# Patient Record
Sex: MALE | Race: WHITE | HISPANIC OR LATINO | Employment: OTHER | URBAN - METROPOLITAN AREA
[De-identification: names, ages, dates, MRNs, and addresses within clinical notes are randomized per-mention and may not be internally consistent; named-entity substitution may affect disease eponyms.]

---

## 2017-08-22 RX ORDER — CARVEDILOL 25 MG/1
25 TABLET ORAL DAILY
Qty: 30 TABLET | Refills: 1 | Status: SHIPPED | OUTPATIENT
Start: 2017-08-22 | End: 2017-08-22 | Stop reason: SDUPTHER

## 2017-08-22 RX ORDER — CARVEDILOL 25 MG/1
25 TABLET ORAL DAILY
Qty: 30 TABLET | Refills: 1 | Status: SHIPPED | OUTPATIENT
Start: 2017-08-22 | End: 2018-04-10

## 2017-08-22 RX ORDER — CARVEDILOL 25 MG/1
25 TABLET ORAL
COMMUNITY
Start: 2014-08-21 | End: 2017-08-22 | Stop reason: SDUPTHER

## 2018-04-10 ENCOUNTER — TELEPHONE (OUTPATIENT)
Dept: CARDIOLOGY | Facility: CLINIC | Age: 78
End: 2018-04-10

## 2018-04-10 ENCOUNTER — CLINICAL SUPPORT NO REQUIREMENTS (OUTPATIENT)
Dept: CARDIOLOGY | Facility: CLINIC | Age: 78
End: 2018-04-10

## 2018-04-10 ENCOUNTER — OFFICE VISIT (OUTPATIENT)
Dept: CARDIOLOGY | Facility: CLINIC | Age: 78
End: 2018-04-10

## 2018-04-10 VITALS
HEIGHT: 73 IN | BODY MASS INDEX: 28.76 KG/M2 | SYSTOLIC BLOOD PRESSURE: 142 MMHG | WEIGHT: 217 LBS | HEART RATE: 56 BPM | DIASTOLIC BLOOD PRESSURE: 88 MMHG

## 2018-04-10 DIAGNOSIS — Z95.810 IMPLANTABLE CARDIOVERTER-DEFIBRILLATOR (ICD) IN SITU: ICD-10-CM

## 2018-04-10 DIAGNOSIS — I25.10 CORONARY ARTERY DISEASE INVOLVING NATIVE CORONARY ARTERY OF NATIVE HEART WITHOUT ANGINA PECTORIS: ICD-10-CM

## 2018-04-10 DIAGNOSIS — I25.5 ISCHEMIC CARDIOMYOPATHY: Primary | ICD-10-CM

## 2018-04-10 DIAGNOSIS — I42.9 CARDIOMYOPATHY, UNSPECIFIED TYPE (HCC): Primary | ICD-10-CM

## 2018-04-10 DIAGNOSIS — I10 ESSENTIAL HYPERTENSION: ICD-10-CM

## 2018-04-10 PROBLEM — N18.30 STAGE 3 CHRONIC KIDNEY DISEASE (HCC): Status: ACTIVE | Noted: 2018-04-10

## 2018-04-10 PROBLEM — E11.9 TYPE 2 DIABETES MELLITUS: Status: ACTIVE | Noted: 2018-04-10

## 2018-04-10 PROCEDURE — 93000 ELECTROCARDIOGRAM COMPLETE: CPT | Performed by: NURSE PRACTITIONER

## 2018-04-10 PROCEDURE — 93283 PRGRMG EVAL IMPLANTABLE DFB: CPT | Performed by: INTERNAL MEDICINE

## 2018-04-10 PROCEDURE — 99214 OFFICE O/P EST MOD 30 MIN: CPT | Performed by: NURSE PRACTITIONER

## 2018-04-10 RX ORDER — CARVEDILOL 25 MG/1
25 TABLET ORAL 2 TIMES DAILY
COMMUNITY
Start: 2018-02-06

## 2018-04-10 RX ORDER — INSULIN GLARGINE 100 [IU]/ML
11 INJECTION, SOLUTION SUBCUTANEOUS
COMMUNITY
Start: 2018-02-06

## 2018-04-10 RX ORDER — LISINOPRIL 10 MG/1
10 TABLET ORAL
COMMUNITY
Start: 2018-02-06

## 2018-04-10 RX ORDER — ASPIRIN 325 MG
325 TABLET ORAL
COMMUNITY
End: 2019-12-04

## 2018-04-10 NOTE — TELEPHONE ENCOUNTER
Let him know that is good-he needs to be sure he is taking the cavedilol twice a day-he told me he didn't think he was taking something twice a day just clarify that is how he is taking it.

## 2018-04-10 NOTE — PROGRESS NOTES
"Date of Office Visit: 04/10/2018  Encounter Provider: JOSUE Sahu  Place of Service: [facility] CARDIOLOGY  Patient Name: Meet Wood Jr.  :1940    Chief Complaint   Patient presents with   • Cardiomyopathy     ICD check   :     HPI: Meet Wood Jr. is a 78 y.o. male who is a patient with a history of MI/CAD/PTCA, ICM, s/p ICD, HTN, HLD, CKD (Dr. Francis) and DM. He was last seen in the office by Dr. Lugo in 2015 and the last time his device was checked by us was 2016. He presents today for routine follow up. He is unsure how he really got lost to follow up. He says his remote monitor is still hooked up but we have not gotten any readings from him since 2016. In reviewing his history with him he tells me he had a heart attack in  and had an angioplasty at [facility] by Dr. Moody. 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 He had a new atrial lead and generator replacement in .     He follows with Dr. Aguilar regularly and also follows with Dr. Francis for his CKD. He says his kidney function has been stable as far as he knows. I am able to review labs in [facility]where from 2017 that showed a BUN 32, creat, 1.6, K+ 5.2, A1c 7.8. He was on good medical therapy when he was last seen in 2015. Today he has cavedilol and lisinopril on his list but he is unsure of the dosages and he says that one of them is prescribed twice a day but he only takes it once a day and has done that for about a year. He just stopped taking it twice a day because he thought it was maybe too much for his kidneys. He is going to call us with his medications, doses and how they are prescribed and how he is really taking them.    He feels good. He denies chest pain, shortness of breath, PND, orthopnea, edema, dizziness or palpitaitons.    Device interrogation today shows normal " device testing and function. He is AS/VS. He has A paced 2% and V paces <1%. He has had 5 ATR episodes lasting 5-11 sec. In duration and 6 non-sustained VT episodes, stored egms 8 and 10 beats in duration. He does not recall any symptoms. Last NSVT episode 10/2/17 and last ATR 2/15/18.           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 2002   • Chronic kidney disease     CKD 3   • Diabetes    • Hyperlipidemia    • Hypertension    • Ischemic cardiomyopathy    • Myocardial infarction 1995    PTCA only   • Sinus bradycardia        Past Surgical History:   Procedure Laterality Date   • CARDIAC DEFIBRILLATOR PLACEMENT  04/30/2009    !st device 2002, 2009 new generator and atrial lead   • CORONARY ANGIOPLASTY  1995       Social History     Social History   • Marital status:      Spouse name: N/A   • Number of children: N/A   • Years of education: N/A     Occupational History   • Not on file.     Social History Main Topics   • Smoking status: Former Smoker   • Smokeless tobacco: Not on file      Comment: no caffeine use   • Alcohol use Yes   • Drug use: Unknown   • Sexual activity: Not on file     Other Topics Concern   • Not on file     Social History Narrative   • No narrative on file       Family History   Problem Relation Age of Onset   • Heart attack Maternal Grandmother        Review of Systems   Constitution: Negative for chills, fever and malaise/fatigue.   Cardiovascular: Negative for chest pain, dyspnea on exertion, leg swelling, near-syncope, orthopnea, palpitations, paroxysmal nocturnal dyspnea and syncope.   Respiratory: Negative for cough and shortness of breath.    Musculoskeletal: Negative for joint pain, joint swelling and myalgias.   Gastrointestinal: Negative for abdominal pain, diarrhea, melena, nausea and vomiting.   Genitourinary: Negative for frequency and hematuria.   Neurological: Negative for light-headedness, numbness,  "paresthesias and seizures.   Allergic/Immunologic: Negative.    All other systems reviewed and are negative.      Allergies   Allergen Reactions   • Sulfa Antibiotics Rash         Current Outpatient Prescriptions:   •  aspirin 325 MG tablet, Take 325 mg by mouth., Disp: , Rfl:   •  carvedilol (COREG) 25 MG tablet, Take 25 mg by mouth 2 (Two) Times a Day., Disp: , Rfl:   •  insulin glargine (LANTUS) 100 UNIT/ML injection, Inject 11 Units under the skin., Disp: , Rfl:   •  insulin lispro (humaLOG) 100 UNIT/ML injection, Inject 1 Units under the skin., Disp: , Rfl:   •  lisinopril (PRINIVIL,ZESTRIL) 10 MG tablet, Take 10 mg by mouth., Disp: , Rfl:       Objective:     Vitals:    04/10/18 1256 04/10/18 1341   BP: 178/90 142/88   Pulse: 56    Weight: 98.4 kg (217 lb)    Height: 185.4 cm (73\")      Body mass index is 28.63 kg/m².    PHYSICAL EXAM:    Vitals Reviewed.   General Appearance: No acute distress, well developed and well nourished.   Eyes: Conjunctiva and lids: No erythema, swelling, or discharge. Sclera non-icteric.   HENT: Atraumatic, normocephalic. External eyes, ears, and nose normal.   Respiratory: No signs of respiratory distress. Respiration rhythm and depth normal.   Clear to auscultation. No rales, crackles, rhonchi, or wheezing auscultated.   Cardiovascular:  Jugular Venous Pressure: Normal  Heart Rate and Rhythm: Normal, Heart Sounds: Normal S1 and S2. No S3 or S4 noted.  Murmurs: No murmurs noted. No rubs, thrills, or gallops.   Arterial Pulses:  Posterior tibialis and dorsalis pedis pulses normal.   Lower Extremities: No edema noted.  Gastrointestinal:  Abdomen soft, non-distended, non-tender.   Musculoskeletal: Normal movement of extremities  Skin and Nails: General appearance normal. No pallor, cyanosis, diaphoresis. Skin temperature normal. No clubbing of fingernails.   Psychiatric: Patient alert and oriented to person, place, and time. Speech and behavior appropriate. Normal mood and affect. "       ECG 12 Lead  Date/Time: 4/10/2018 12:52 PM  Performed by: AMARJIT JOHNSON  Authorized by: AMARJIT JOHNSON   Comparison: compared with previous ECG from 3/14/2014  Rhythm: sinus bradycardia  BPM: 57  Comments: Non specific T wave abnormality              Assessment:       Diagnosis Plan   1. Ischemic cardiomyopathy  ECG 12 Lead   2. Implantable cardioverter-defibrillator (ICD) in situ  ECG 12 Lead   3. Essential hypertension  ECG 12 Lead   4. Coronary artery disease involving native coronary artery of native heart without angina pectoris            Plan:       1. -2. ICM, s/p ICD. Stable. No HF symptoms. I think he is on GDMT but he did not know his exact meds. He is going to call me back with a list and doses but I think he is on ACE and BB.    3. HTN, initial reading elevated, repeat was better but also don't think he is taking his carvedilol twice daily, maybe adjust dose when he calls with updated list.    4. Coronary Artery Disease  Assessment  • The patient has no angina    Subjective - Objective  • There is a history of past MI  • There has been a previous POBA  • Current antiplatelet therapy includes aspirin 325 mg      He is schedule for remote check in 3 months, he is calling [facility] to check his home monitor to be sure it is working. Return to see Dr. Lugo in 6 months with device check at that time.     As always, it has been a pleasure to participate in your patient's care.      Sincerely,         JOSUE Torres

## 2018-04-10 NOTE — TELEPHONE ENCOUNTER
Pt called in regards to medicines. He is currently taking:  Vitamin D- 2000 units 1 tablet daily  Lisinopril- 10 MG once daily  Atorvastatin- 40 MG once daily  Carvedilol- 25 MG 1 tablet every 12 hours  Lantus  Humalog  He also called [email or web address] and they said his home monitor is working just fine.........[place]

## 2018-07-20 ENCOUNTER — CLINICAL SUPPORT NO REQUIREMENTS (OUTPATIENT)
Dept: CARDIOLOGY | Facility: CLINIC | Age: 78
End: 2018-07-20

## 2018-07-20 DIAGNOSIS — I25.5 ISCHEMIC CARDIOMYOPATHY: Primary | ICD-10-CM

## 2018-07-20 DIAGNOSIS — I47.20 VENTRICULAR TACHYCARDIA (HCC): ICD-10-CM

## 2018-07-20 PROCEDURE — 93295 DEV INTERROG REMOTE 1/2/MLT: CPT | Performed by: INTERNAL MEDICINE

## 2018-07-20 PROCEDURE — 93296 REM INTERROG EVL PM/IDS: CPT | Performed by: INTERNAL MEDICINE

## 2018-10-29 ENCOUNTER — OFFICE VISIT (OUTPATIENT)
Dept: CARDIOLOGY | Facility: CLINIC | Age: 78
End: 2018-10-29

## 2018-10-29 ENCOUNTER — CLINICAL SUPPORT NO REQUIREMENTS (OUTPATIENT)
Dept: CARDIOLOGY | Facility: CLINIC | Age: 78
End: 2018-10-29

## 2018-10-29 VITALS
HEART RATE: 53 BPM | SYSTOLIC BLOOD PRESSURE: 140 MMHG | DIASTOLIC BLOOD PRESSURE: 86 MMHG | HEIGHT: 73 IN | BODY MASS INDEX: 26.56 KG/M2 | WEIGHT: 200.4 LBS

## 2018-10-29 DIAGNOSIS — N18.30 STAGE 3 CHRONIC KIDNEY DISEASE (HCC): ICD-10-CM

## 2018-10-29 DIAGNOSIS — I10 ESSENTIAL HYPERTENSION: ICD-10-CM

## 2018-10-29 DIAGNOSIS — I25.5 ISCHEMIC CARDIOMYOPATHY: Primary | ICD-10-CM

## 2018-10-29 DIAGNOSIS — Z95.810 IMPLANTABLE CARDIOVERTER-DEFIBRILLATOR (ICD) IN SITU: ICD-10-CM

## 2018-10-29 DIAGNOSIS — I25.10 CORONARY ARTERY DISEASE INVOLVING NATIVE CORONARY ARTERY OF NATIVE HEART WITHOUT ANGINA PECTORIS: Primary | ICD-10-CM

## 2018-10-29 DIAGNOSIS — I25.5 ISCHEMIC CARDIOMYOPATHY: ICD-10-CM

## 2018-10-29 PROCEDURE — 93000 ELECTROCARDIOGRAM COMPLETE: CPT | Performed by: INTERNAL MEDICINE

## 2018-10-29 PROCEDURE — 99213 OFFICE O/P EST LOW 20 MIN: CPT | Performed by: INTERNAL MEDICINE

## 2018-10-29 PROCEDURE — 93283 PRGRMG EVAL IMPLANTABLE DFB: CPT | Performed by: INTERNAL MEDICINE

## 2018-10-29 NOTE — PROGRESS NOTES
Date of Office Visit: 10/29/2018  Encounter Provider: Kevan Lugo MD  Place of Service: [facility] CARDIOLOGY  Patient Name: Meet Wood Jr.  : 1940    Subjective:     Encounter Date:10/29/2018      Patient ID: Meet Wood Jr. is a 78 y.o. male who has a cc of ICM and ICD     The patient had a good year.   No anginal chest pain,   No sig urias,   No soa,   No fainting,  No orthostasis.   No edema.   Exercise tolerance: walks his dog a lot.     There have been no hospital admission since the last visit.     There have been no bleeding events.       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    • Chronic kidney disease     CKD 3   • Diabetes (CMS/[facility])    • Hyperlipidemia    • Hypertension    • Ischemic cardiomyopathy    • Myocardial infarction (CMS/[facility])     PTCA only   • Sinus bradycardia        Social History     Social History   • Marital status:      Spouse name: N/A   • Number of children: N/A   • Years of education: N/A     Occupational History   • Not on file.     Social History Main Topics   • Smoking status: Former Smoker   • Smokeless tobacco: Not on file      Comment: no caffeine use   • Alcohol use Yes   • Drug use: Unknown   • Sexual activity: Not on file     Other Topics Concern   • Not on file     Social History Narrative   • No narrative on file       Review of Systems   Constitution: Negative for fever and night sweats.   HENT: Negative for ear pain and stridor.    Eyes: Negative for discharge and visual halos.   Cardiovascular: Negative for cyanosis.   Respiratory: Negative for hemoptysis and sputum production.    Hematologic/Lymphatic: Negative for adenopathy.   Skin: Negative for nail changes and unusual hair distribution.   Musculoskeletal: Negative for gout and joint swelling.   Gastrointestinal: Negative for bowel incontinence and flatus.   Genitourinary: Negative for  "dysuria and flank pain.   Neurological: Negative for seizures and tremors.   Psychiatric/Behavioral: Negative for altered mental status. The patient is not nervous/anxious.             Objective:     Vitals:    10/29/18 1021   BP: 140/86   BP Location: Right arm   Patient Position: Sitting   Pulse: 53   Weight: 90.9 kg (200 lb 6.4 oz)   Height: 185.4 cm (73\")         Physical Exam   Constitutional: He is oriented to person, place, and time.   HENT:   Head: Normocephalic and atraumatic.   Eyes: Right eye exhibits no discharge. Left eye exhibits no discharge.   Neck: No JVD present. No thyromegaly present.   Cardiovascular: Normal rate and regular rhythm.  Exam reveals no gallop and no friction rub.    No murmur heard.  Pulmonary/Chest: Effort normal and breath sounds normal. He has no rales.   Abdominal: Soft. Bowel sounds are normal. There is no tenderness.   Musculoskeletal: Normal range of motion. He exhibits no edema or deformity.   Neurological: He is alert and oriented to person, place, and time. He exhibits normal muscle tone.   Skin: Skin is warm and dry. No erythema.   Psychiatric: He has a normal mood and affect. His behavior is normal. Thought content normal.         ECG 12 Lead  Date/Time: 10/29/2018 10:30 AM  Performed by: KAMI WILLIAM  Authorized by: KAMI WILLIAM   Comparison: compared with previous ECG   Similar to previous ECG  Rhythm: paced  Conduction: 1st degree  Clinical impression: abnormal ECG            Lab Review:       Assessment:          Diagnosis Plan   1. Coronary artery disease involving native coronary artery of native heart without angina pectoris     2. Essential hypertension     3. Implantable cardioverter-defibrillator (ICD) in situ     4. Ischemic cardiomyopathy     5. Stage 3 chronic kidney disease (CMS/HCC)            Plan:       He reports no concerning cardiac symptoms   Normal exam  ECG ok  ICD check -ok --> no VT, or AF  Most bothered by fluctuating blood sugar.     He is " on GDMT - for some reason his statin did make the med list but he takes it.

## 2018-12-28 ENCOUNTER — HOSPITAL ENCOUNTER (OUTPATIENT)
Dept: CT IMAGING | Facility: HOSPITAL | Age: 78
Discharge: HOME | End: 2018-12-28
Admitting: INTERNAL MEDICINE

## 2018-12-28 ENCOUNTER — TRANSCRIBE ORDERS (OUTPATIENT)
Dept: ADMINISTRATIVE | Facility: HOSPITAL | Age: 78
End: 2018-12-28

## 2018-12-28 DIAGNOSIS — R31.9 HEMATURIA, UNSPECIFIED TYPE: Primary | ICD-10-CM

## 2018-12-28 DIAGNOSIS — R31.9 HEMATURIA, UNSPECIFIED TYPE: ICD-10-CM

## 2018-12-28 PROCEDURE — 74176 CT ABD & PELVIS W/O CONTRAST: CPT

## 2019-01-31 ENCOUNTER — CLINICAL SUPPORT NO REQUIREMENTS (OUTPATIENT)
Dept: CARDIOLOGY | Facility: CLINIC | Age: 79
End: 2019-01-31

## 2019-01-31 DIAGNOSIS — I25.5 ISCHEMIC CARDIOMYOPATHY: Primary | ICD-10-CM

## 2019-01-31 PROCEDURE — 93295 DEV INTERROG REMOTE 1/2/MLT: CPT | Performed by: INTERNAL MEDICINE

## 2019-01-31 PROCEDURE — 93296 REM INTERROG EVL PM/IDS: CPT | Performed by: INTERNAL MEDICINE

## 2019-05-20 ENCOUNTER — OFFICE VISIT (OUTPATIENT)
Dept: CARDIOLOGY | Facility: CLINIC | Age: 79
End: 2019-05-20

## 2019-05-20 VITALS
WEIGHT: 214.4 LBS | HEART RATE: 50 BPM | HEIGHT: 73 IN | BODY MASS INDEX: 28.41 KG/M2 | DIASTOLIC BLOOD PRESSURE: 74 MMHG | SYSTOLIC BLOOD PRESSURE: 120 MMHG

## 2019-05-20 DIAGNOSIS — Z95.810 IMPLANTABLE CARDIOVERTER-DEFIBRILLATOR (ICD) IN SITU: ICD-10-CM

## 2019-05-20 DIAGNOSIS — I25.10 CORONARY ARTERY DISEASE INVOLVING NATIVE CORONARY ARTERY OF NATIVE HEART WITHOUT ANGINA PECTORIS: ICD-10-CM

## 2019-05-20 DIAGNOSIS — I25.5 ISCHEMIC CARDIOMYOPATHY: Primary | ICD-10-CM

## 2019-05-20 PROCEDURE — 93000 ELECTROCARDIOGRAM COMPLETE: CPT | Performed by: PHYSICIAN ASSISTANT

## 2019-05-20 PROCEDURE — 99214 OFFICE O/P EST MOD 30 MIN: CPT | Performed by: PHYSICIAN ASSISTANT

## 2019-05-20 NOTE — PROGRESS NOTES
Date of Office Visit: 2019  Encounter Provider: ARASELI May  Place of Service: [facility] CARDIOLOGY  Patient Name: Meet Wood Jr.  :1940    Chief Complaint   Patient presents with   • Coronary Artery Disease   • Hypertension   :     HPI: Meet Wood Jr. is a 79 y.o. male, new to me, who presents today for follow-up.  Old records have been obtained and reviewed by me.  Is a patient of Dr. Lugo's with a past cardiac history significant for coronary artery disease status post stent placement, ischemic cardiomyopathy status post ICD placement, and hypertension.  He also has chronic kidney disease and follows with Dr. Francis.  He was last in our office to see Dr. Lugo on 10/29/2018.  At that visit he was doing well without complaints of angina or heart failure.  Recommendations were to return to the office in a year.     Since he was last in our office he has been doing well.  He denies any chest pain, shortness of breath, palpitations, dizziness, or syncope.  He and his wife went to a [email or web address] last week and he walked a little bit further than normal.  Now his left foot is a little swollen and painful.  He watches his sodium intake.  He does not really get much exercise but he does walk his dog every now and then.  Overall he feels great.      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    • Chronic kidney disease     CKD 3   • Diabetes (CMS/[facility])    • Hyperlipidemia    • Hypertension    • Ischemic cardiomyopathy    • Myocardial infarction (CMS/[facility])     PTCA only   • Sinus bradycardia        Past Surgical History:   Procedure Laterality Date   • CARDIAC DEFIBRILLATOR PLACEMENT  2009    !st device ,  new generator and atrial lead   • CORONARY ANGIOPLASTY         Social History     Socioeconomic History   • Marital status:      Spouse name: Not on file  "  • Number of children: Not on file   • Years of education: Not on file   • Highest education level: Not on file   Tobacco Use   • Smoking status: Former Smoker   • Tobacco comment: no caffeine use   Substance and Sexual Activity   • Alcohol use: Yes     Comment: rarely       Family History   Problem Relation Age of Onset   • Heart attack Maternal Grandmother        Review of Systems   Constitution: Negative for chills, fever and malaise/fatigue.   Cardiovascular: Negative for chest pain, dyspnea on exertion, leg swelling, near-syncope, orthopnea, palpitations, paroxysmal nocturnal dyspnea and syncope.   Respiratory: Negative for cough and shortness of breath.    Musculoskeletal: Positive for joint pain. Negative for joint swelling and myalgias.   Gastrointestinal: Negative for abdominal pain, diarrhea, melena, nausea and vomiting.   Genitourinary: Negative for frequency and hematuria.   Neurological: Negative for light-headedness, numbness, paresthesias and seizures.   Allergic/Immunologic: Negative.    All other systems reviewed and are negative.      Allergies   Allergen Reactions   • Sulfa Antibiotics Rash         Current Outpatient Medications:   •  aspirin 325 MG tablet, Take 325 mg by mouth., Disp: , Rfl:   •  carvedilol (COREG) 25 MG tablet, Take 25 mg by mouth 2 (Two) Times a Day., Disp: , Rfl:   •  insulin glargine (LANTUS) 100 UNIT/ML injection, Inject 11 Units under the skin., Disp: , Rfl:   •  insulin lispro (humaLOG) 100 UNIT/ML injection, Inject 1 Units under the skin., Disp: , Rfl:   •  lisinopril (PRINIVIL,ZESTRIL) 10 MG tablet, Take 10 mg by mouth., Disp: , Rfl:       Objective:     Vitals:    05/20/19 1020 05/20/19 1024   BP: 124/70 120/74   BP Location: Right arm Left arm   Patient Position: Sitting Sitting   Pulse:  50   Weight: 97.3 kg (214 lb 6.4 oz)    Height: 185.4 cm (73\")      Body mass index is 28.29 kg/m².    PHYSICAL EXAM:    Physical Exam   Constitutional: He is oriented to person, " place, and time. He appears well-developed and well-nourished. No distress.   HENT:   Head: Normocephalic and atraumatic.   Eyes: Pupils are equal, round, and reactive to light.   Neck: No JVD present. No thyromegaly present.   Cardiovascular: Normal rate, regular rhythm, normal heart sounds and intact distal pulses.   No murmur heard.  Pulmonary/Chest: Effort normal and breath sounds normal. No respiratory distress. He has no rales.   Abdominal: Soft. Bowel sounds are normal. He exhibits no distension and no ascites. There is no splenomegaly or hepatomegaly. There is no tenderness.   Musculoskeletal: Normal range of motion. He exhibits no edema.   Neurological: He is alert and oriented to person, place, and time.   Skin: Skin is warm and dry. He is not diaphoretic. No erythema.   Psychiatric: He has a normal mood and affect. His behavior is normal. Judgment normal.         ECG 12 Lead  Date/Time: 5/20/2019 10:35 AM  Performed by: Seble Gutierrez PA  Authorized by: Seble Gutierrez PA   Comparison: compared with previous ECG from 10/29/2018  Similar to previous ECG  Rhythm: paced  BPM: 50    Clinical impression: abnormal EKG  Comments: Indication: Cardiomyopathy              Assessment:       Diagnosis Plan   1. Ischemic cardiomyopathy  ECG 12 Lead   2. Implantable cardioverter-defibrillator (ICD) in situ  ECG 12 Lead   3. Coronary artery disease involving native coronary artery of native heart without angina pectoris  ECG 12 Lead     Orders Placed This Encounter   Procedures   • ECG 12 Lead     This order was created via procedure documentation          Plan:       Overall he is doing really well.  I think that the pain in his left foot is musculoskeletal and not cardiac.  He has trace edema in his ankles.  His physical exam is completely normal.  He is watching his salt intake and rarely drinks alcohol, I have asked him to increase his exercise.  Otherwise he is doing well and I am not going to make any  changes.  He will follow-up with Ericka Guadalupe for his regular appointment in November.    Coronary Artery Disease  Assessment  • The patient has no angina    Plan  • Lifestyle modifications discussed include adhering to a heart healthy diet and regular exercise    Subjective - Objective  • There has been a previous stent procedure using ARIN  • Current antiplatelet therapy includes aspirin 81 mg    Heart Failure  Assessment  • NYHA class I - There is no limitation of physical activity. Physical activity does not cause fatigue, palpitations or shortness of breath.  • ACE inhibitor prescribed  • Beta blocker prescribed    Plan  • The patient has received heart failure education on the following topics: dietary sodium restriction, physical activity and minimizing alcohol intake  • The heart failure care plan was discussed with the patient today including: continuing the current program    Subjective/Objective    • Physical exam findings negative for rales, peripheral edema, elevated JVP, hepatomegaly and ascites.  • The patient has an ICD implant            As always, it has been a pleasure to participate in your patient's care.      Sincerely,         Seble Gutierrez PA-C

## 2019-11-11 ENCOUNTER — OFFICE VISIT (OUTPATIENT)
Dept: CARDIOLOGY | Facility: CLINIC | Age: 79
End: 2019-11-11

## 2019-11-11 ENCOUNTER — CLINICAL SUPPORT NO REQUIREMENTS (OUTPATIENT)
Dept: CARDIOLOGY | Facility: CLINIC | Age: 79
End: 2019-11-11

## 2019-11-11 VITALS
WEIGHT: 210 LBS | BODY MASS INDEX: 27.83 KG/M2 | SYSTOLIC BLOOD PRESSURE: 104 MMHG | DIASTOLIC BLOOD PRESSURE: 78 MMHG | HEART RATE: 55 BPM | HEIGHT: 73 IN

## 2019-11-11 DIAGNOSIS — I25.5 ISCHEMIC CARDIOMYOPATHY: Primary | ICD-10-CM

## 2019-11-11 DIAGNOSIS — I25.10 CORONARY ARTERY DISEASE INVOLVING NATIVE CORONARY ARTERY OF NATIVE HEART WITHOUT ANGINA PECTORIS: ICD-10-CM

## 2019-11-11 DIAGNOSIS — I10 ESSENTIAL HYPERTENSION: ICD-10-CM

## 2019-11-11 DIAGNOSIS — Z95.810 IMPLANTABLE CARDIOVERTER-DEFIBRILLATOR (ICD) IN SITU: ICD-10-CM

## 2019-11-11 PROCEDURE — 93000 ELECTROCARDIOGRAM COMPLETE: CPT | Performed by: NURSE PRACTITIONER

## 2019-11-11 PROCEDURE — 99214 OFFICE O/P EST MOD 30 MIN: CPT | Performed by: NURSE PRACTITIONER

## 2019-11-11 PROCEDURE — 93283 PRGRMG EVAL IMPLANTABLE DFB: CPT | Performed by: INTERNAL MEDICINE

## 2019-11-11 NOTE — PROGRESS NOTES
Date of Office Visit: 2019  Encounter Provider: JOSUE Sahu  Place of Service: [facility] CARDIOLOGY  Patient Name: Meet Wood Jr.  :1940    Chief Complaint   Patient presents with   • Cardiomyopathy     ICD check    :     HPI: Meet Wood Jr. is a 79 y.o. male who is a patient followed by Dr. Lugo with a history of MI/CAD/PTCA in the remote past, ischemic cardiomyopathy, status post dual-chamber ICD, hypertension, CKD followed by Dr. Antoine Francis and diabetes.  He presents today for routine follow-up.    He reports that he is doing well.  He denies chest pain, significant dyspnea, PND, orthopnea, dizziness, palpitations or edema.    Device interrogation shows normal testing and function.  He is atrially paced 4% and ventricularly paced less than 5%.  He has had 5 ATR episodes, the longest was 8 seconds in duration and for NST episodes with 2 EGM 1-6 beats and the other is 1-1 conduction.  He is asymptomatic.       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    • CAD (coronary artery disease)    • Chronic kidney disease     CKD 3   • Diabetes (CMS/HCC)    • Hyperlipidemia    • Hypertension    • Implantable cardioverter-defibrillator (ICD) in situ    • Ischemic cardiomyopathy    • Myocardial infarction (CMS/HCC)     PTCA only   • Sinus bradycardia        Past Surgical History:   Procedure Laterality Date   • CARDIAC DEFIBRILLATOR PLACEMENT  2009    !st device ,  new generator and atrial lead   • CORONARY ANGIOPLASTY         Social History     Socioeconomic History   • Marital status:      Spouse name: Not on file   • Number of children: Not on file   • Years of education: Not on file   • Highest education level: Not on file   Tobacco Use   • Smoking status: Former Smoker   • Tobacco comment: no caffeine use   Substance and Sexual Activity   • Alcohol use: Yes      "Comment: rarely       Family History   Problem Relation Age of Onset   • Heart attack Maternal Grandmother        Review of Systems   Constitution: Negative for chills, fever and malaise/fatigue.   Cardiovascular: Negative for chest pain, dyspnea on exertion, leg swelling, near-syncope, orthopnea, palpitations, paroxysmal nocturnal dyspnea and syncope.   Respiratory: Negative for cough and shortness of breath.    Musculoskeletal: Negative for joint pain, joint swelling and myalgias.   Gastrointestinal: Negative for abdominal pain, diarrhea, melena, nausea and vomiting.   Genitourinary: Negative for frequency and hematuria.   Neurological: Negative for light-headedness, numbness, paresthesias and seizures.   Allergic/Immunologic: Negative.    All other systems reviewed and are negative.      Allergies   Allergen Reactions   • Sulfa Antibiotics Rash         Current Outpatient Medications:   •  aspirin 325 MG tablet, Take 325 mg by mouth., Disp: , Rfl:   •  carvedilol (COREG) 25 MG tablet, Take 25 mg by mouth 2 (Two) Times a Day., Disp: , Rfl:   •  insulin glargine (LANTUS) 100 UNIT/ML injection, Inject 11 Units under the skin., Disp: , Rfl:   •  insulin lispro (humaLOG) 100 UNIT/ML injection, Inject 1 Units under the skin., Disp: , Rfl:   •  lisinopril (PRINIVIL,ZESTRIL) 10 MG tablet, Take 10 mg by mouth., Disp: , Rfl:       Objective:     Vitals:    11/11/19 1044   BP: 104/78   Pulse: 55   Weight: 95.3 kg (210 lb)   Height: 185.4 cm (72.99\")     Body mass index is 27.71 kg/m².    PHYSICAL EXAM:    Vitals Reviewed.   General Appearance: No acute distress, well developed and well nourished.   Eyes: Conjunctiva and lids: No erythema, swelling, or discharge. Sclera non-icteric.   HENT: Atraumatic, normocephalic. External eyes, ears, and nose normal.   Respiratory: No signs of respiratory distress. Respiration rhythm and depth normal.   Clear to auscultation. No rales, crackles, rhonchi, or wheezing auscultated. "   Cardiovascular:  Jugular Venous Pressure: Normal  Heart Rate and Rhythm: Normal, Heart Sounds: Normal S1 and S2. No S3 or S4 noted.  Murmurs: No murmurs noted. No rubs, thrills, or gallops.   Arterial Pulses:  Posterior tibialis and dorsalis pedis pulses normal.   Lower Extremities: No edema noted.  Gastrointestinal:  Abdomen soft, non-distended, non-tender.   Musculoskeletal: Normal movement of extremities  Skin and Nails: General appearance normal. No pallor, cyanosis, diaphoresis. Skin temperature normal. No clubbing of fingernails.   Psychiatric: Patient alert and oriented to person, place, and time. Speech and behavior appropriate. Normal mood and affect.       ECG 12 Lead  Date/Time: 11/11/2019 12:12 PM  Performed by: Rubina Guadalupe APRN  Authorized by: Rubina Guadalupe APRN   Comparison: compared with previous ECG   Similar to previous ECG  Rhythm: sinus bradycardia  BPM: 55  Conduction: left anterior fascicular block and 1st degree AV block              Assessment:       Diagnosis Plan   1. Ischemic cardiomyopathy     2. Implantable cardioverter-defibrillator (ICD) in situ     3. Essential hypertension     4. Coronary artery disease involving native coronary artery of native heart without angina pectoris            Plan:       1.-2.  Ischemic cardiomyopathy, status post ICD.  No heart failure by exam, ICD with normal testing and function.    3.  Pretension, controlled.    4.  History of CAD, status post PTCA in the remote past by Dr. Moody. No chest pain/angina.     Follow-up with Dr. Lugo in 6 months with device check at that time.    As always, it has been a pleasure to participate in your patient's care.      Sincerely,         JOSUE Torres

## 2019-11-25 ENCOUNTER — TELEPHONE (OUTPATIENT)
Dept: CARDIOLOGY | Facility: CLINIC | Age: 79
End: 2019-11-25

## 2019-11-25 ENCOUNTER — CLINICAL SUPPORT NO REQUIREMENTS (OUTPATIENT)
Dept: CARDIOLOGY | Facility: CLINIC | Age: 79
End: 2019-11-25

## 2019-11-25 DIAGNOSIS — I47.20 VENTRICULAR TACHYCARDIA (HCC): Primary | ICD-10-CM

## 2019-11-25 NOTE — TELEPHONE ENCOUNTER
Alert transmission received today for atrial arrhythmia burden.  Per arrhythmia log atr was in progress at time of transmission this morning at 0241 since 11/21/19 1026.  There was no presenting egm to view.  I called pt and had to leave message for him.  I let him now we received an alert transmission and need for him to send a manual transmission for further review.  I am out until next Monday, so if you need anything before then you may want to send to another of the nurses in the pm dept.  I do not see where he has a history of af.

## 2019-11-26 ENCOUNTER — CLINICAL SUPPORT NO REQUIREMENTS (OUTPATIENT)
Dept: CARDIOLOGY | Facility: CLINIC | Age: 79
End: 2019-11-26

## 2019-11-26 DIAGNOSIS — I25.5 ISCHEMIC CARDIOMYOPATHY: Primary | ICD-10-CM

## 2019-11-26 DIAGNOSIS — I47.20 VENTRICULAR TACHYCARDIA (HCC): ICD-10-CM

## 2019-11-26 PROCEDURE — 93296 REM INTERROG EVL PM/IDS: CPT | Performed by: INTERNAL MEDICINE

## 2019-11-26 PROCEDURE — 93295 DEV INTERROG REMOTE 1/2/MLT: CPT | Performed by: INTERNAL MEDICINE

## 2019-11-26 NOTE — TELEPHONE ENCOUNTER
Pt has [employer] dual chamber ICD programmed DDDR    Manual transmission as requested received and reviewed. Presents AF/VS With Vrate 110s. This appears to be 2:1 Aflutter that is ongoing since 11/21 1026. No further VT as reported 11/25 remote. I spoke with pt. He denies symptoms saying he feels well. He does not recall symptoms related to 11/15 VT episode. Pt has no prev hx of AF. He is not on AC.  NS

## 2019-11-27 NOTE — TELEPHONE ENCOUNTER
Please call and add pt to 12/4 @ 1:20 per his conversation with Selin. He needs a pacer appt as well...Ama...Ama

## 2019-12-04 ENCOUNTER — CLINICAL SUPPORT NO REQUIREMENTS (OUTPATIENT)
Dept: CARDIOLOGY | Facility: CLINIC | Age: 79
End: 2019-12-04

## 2019-12-04 ENCOUNTER — OFFICE VISIT (OUTPATIENT)
Dept: CARDIOLOGY | Facility: CLINIC | Age: 79
End: 2019-12-04

## 2019-12-04 VITALS
DIASTOLIC BLOOD PRESSURE: 82 MMHG | WEIGHT: 215 LBS | HEIGHT: 74 IN | BODY MASS INDEX: 27.59 KG/M2 | SYSTOLIC BLOOD PRESSURE: 120 MMHG | HEART RATE: 111 BPM

## 2019-12-04 DIAGNOSIS — I48.4 ATYPICAL ATRIAL FLUTTER (HCC): ICD-10-CM

## 2019-12-04 DIAGNOSIS — I25.5 ISCHEMIC CARDIOMYOPATHY: Primary | ICD-10-CM

## 2019-12-04 DIAGNOSIS — I10 ESSENTIAL HYPERTENSION: ICD-10-CM

## 2019-12-04 DIAGNOSIS — I25.10 CORONARY ARTERY DISEASE INVOLVING NATIVE CORONARY ARTERY OF NATIVE HEART WITHOUT ANGINA PECTORIS: ICD-10-CM

## 2019-12-04 DIAGNOSIS — Z95.810 IMPLANTABLE CARDIOVERTER-DEFIBRILLATOR (ICD) IN SITU: ICD-10-CM

## 2019-12-04 DIAGNOSIS — N18.30 STAGE 3 CHRONIC KIDNEY DISEASE (HCC): ICD-10-CM

## 2019-12-04 PROCEDURE — 99214 OFFICE O/P EST MOD 30 MIN: CPT | Performed by: INTERNAL MEDICINE

## 2019-12-04 PROCEDURE — 93000 ELECTROCARDIOGRAM COMPLETE: CPT | Performed by: INTERNAL MEDICINE

## 2019-12-04 RX ORDER — ATORVASTATIN CALCIUM 40 MG/1
40 TABLET, FILM COATED ORAL DAILY
COMMUNITY

## 2019-12-04 NOTE — PROGRESS NOTES
Date of Office Visit: 2019  Encounter Provider: Kevan Lugo MD  Place of Service: [facility] CARDIOLOGY  Patient Name: Meet Wood Jr.  : 1940    Subjective:     Encounter Date:2019      Patient ID: Meet Wood Jr. is a 79 y.o. male who has a cc of  ICM and VT and ICD and recent disc of atrial flutter and now is stuck at 111 BPM     He has diabetes and CKD (creat 1.4)     The patient had a good year.   No anginal chest pain,   No sig urias,   No soa,   No fainting,  No orthostasis.   No edema.   Exercise tolerance: good     There have been no hospital admission since the last visit.     There have been no bleeding events.       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    • CAD (coronary artery disease)    • Chronic kidney disease     CKD 3   • Diabetes (CMS/[facility])    • Hyperlipidemia    • Hypertension    • Implantable cardioverter-defibrillator (ICD) in situ    • Ischemic cardiomyopathy    • Myocardial infarction (CMS/HCC)     PTCA only   • Sinus bradycardia        Social History     Socioeconomic History   • Marital status:      Spouse name: Not on file   • Number of children: Not on file   • Years of education: Not on file   • Highest education level: Not on file   Tobacco Use   • Smoking status: Former Smoker   • Tobacco comment: no caffeine use   Substance and Sexual Activity   • Alcohol use: Yes     Comment: rarely       Review of Systems   Constitution: Negative for fever and night sweats.   HENT: Negative for ear pain and stridor.    Eyes: Negative for discharge and visual halos.   Cardiovascular: Negative for cyanosis.   Respiratory: Negative for hemoptysis and sputum production.    Hematologic/Lymphatic: Negative for adenopathy.   Skin: Negative for nail changes and unusual hair distribution.   Musculoskeletal: Negative for gout and joint swelling.   Gastrointestinal:  "Negative for bowel incontinence and flatus.   Genitourinary: Negative for dysuria and flank pain.   Neurological: Negative for seizures and tremors.   Psychiatric/Behavioral: Negative for altered mental status. The patient is not nervous/anxious.             Objective:     Vitals:    12/04/19 1308   BP: 120/82   BP Location: Right arm   Patient Position: Sitting   Cuff Size: Large Adult   Pulse: 111   Weight: 97.5 kg (215 lb)   Height: 186.7 cm (73.5\")         Physical Exam   Constitutional: He is oriented to person, place, and time.   HENT:   Head: Normocephalic and atraumatic.   Eyes: Right eye exhibits no discharge. Left eye exhibits no discharge.   Neck: No JVD present. No thyromegaly present.   Cardiovascular: Regular rhythm. Tachycardia present. Exam reveals no gallop and no friction rub.   No murmur heard.  Pulmonary/Chest: Effort normal and breath sounds normal. He has no rales.   Abdominal: Soft. Bowel sounds are normal. There is no tenderness.   Musculoskeletal: Normal range of motion. He exhibits no edema or deformity.   Neurological: He is alert and oriented to person, place, and time. He exhibits normal muscle tone.   Skin: Skin is warm and dry. No erythema.   Psychiatric: He has a normal mood and affect. His behavior is normal. Thought content normal.         ECG 12 Lead  Date/Time: 12/4/2019 1:50 PM  Performed by: Kevan Lugo MD  Authorized by: Kevan Lugo MD   Comparison: compared with previous ECG   Similar to previous ECG  Rhythm: atrial flutter    Clinical impression: abnormal EKG            Lab Review:       Assessment:          Diagnosis Plan   1. Ischemic cardiomyopathy     2. Implantable cardioverter-defibrillator (ICD) in situ     3. Essential hypertension     4. Coronary artery disease involving native coronary artery of native heart without angina pectoris     5. Stage 3 chronic kidney disease (CMS/HCC)            Plan:      He is in new onset atrial flutter     He is not " symptomatic     The plan will be to start apixaban -- (creat 1.4) 5 BID // stop asa.     I will do a zio to check heart rate

## 2019-12-09 ENCOUNTER — CLINICAL SUPPORT NO REQUIREMENTS (OUTPATIENT)
Dept: CARDIOLOGY | Facility: CLINIC | Age: 79
End: 2019-12-09

## 2019-12-09 ENCOUNTER — TELEPHONE (OUTPATIENT)
Dept: CARDIOLOGY | Facility: CLINIC | Age: 79
End: 2019-12-09

## 2019-12-09 DIAGNOSIS — I25.5 ISCHEMIC CARDIOMYOPATHY: Primary | ICD-10-CM

## 2019-12-09 NOTE — TELEPHONE ENCOUNTER
Patient has [employer] dual chamber ICD.    Alert remote transmission received and reviewed. Alert is for atrial arrhythmia burden of at least 24 hrs. No presenting EGM available. No new episodes since clinic check on 12/4/19 per logbook. Patient saw you in office on 12/4/19, has new diagnosis of atrial flutter. He was started on Eliquis. I called patient to make sure he did start taking the new medication, he confirms he has. Can I turn off atrial arrhythmia alerts in [place]?

## 2019-12-27 DIAGNOSIS — R94.31 ABNORMAL ELECTROCARDIOGRAM (ECG) (EKG): ICD-10-CM

## 2019-12-27 DIAGNOSIS — I48.91 ATRIAL FIBRILLATION, UNSPECIFIED TYPE (HCC): Primary | ICD-10-CM

## 2020-01-15 ENCOUNTER — HOSPITAL ENCOUNTER (OUTPATIENT)
Dept: CARDIOLOGY | Facility: HOSPITAL | Age: 80
Discharge: HOME | End: 2020-01-15
Admitting: INTERNAL MEDICINE

## 2020-01-15 ENCOUNTER — CLINICAL SUPPORT NO REQUIREMENTS (OUTPATIENT)
Dept: CARDIOLOGY | Facility: CLINIC | Age: 80
End: 2020-01-15

## 2020-01-15 ENCOUNTER — OFFICE VISIT (OUTPATIENT)
Dept: CARDIOLOGY | Facility: CLINIC | Age: 80
End: 2020-01-15

## 2020-01-15 VITALS
HEIGHT: 73 IN | WEIGHT: 213 LBS | BODY MASS INDEX: 28.23 KG/M2 | DIASTOLIC BLOOD PRESSURE: 84 MMHG | HEART RATE: 108 BPM | SYSTOLIC BLOOD PRESSURE: 146 MMHG

## 2020-01-15 VITALS
BODY MASS INDEX: 28.1 KG/M2 | SYSTOLIC BLOOD PRESSURE: 132 MMHG | HEART RATE: 102 BPM | WEIGHT: 212 LBS | HEIGHT: 73 IN | DIASTOLIC BLOOD PRESSURE: 88 MMHG

## 2020-01-15 DIAGNOSIS — N18.30 STAGE 3 CHRONIC KIDNEY DISEASE (HCC): ICD-10-CM

## 2020-01-15 DIAGNOSIS — I25.5 ISCHEMIC CARDIOMYOPATHY: Primary | ICD-10-CM

## 2020-01-15 DIAGNOSIS — Z95.810 IMPLANTABLE CARDIOVERTER-DEFIBRILLATOR (ICD) IN SITU: ICD-10-CM

## 2020-01-15 DIAGNOSIS — I10 ESSENTIAL HYPERTENSION: ICD-10-CM

## 2020-01-15 DIAGNOSIS — R94.31 ABNORMAL ELECTROCARDIOGRAM (ECG) (EKG): ICD-10-CM

## 2020-01-15 DIAGNOSIS — Z95.810 ICD (IMPLANTABLE CARDIOVERTER-DEFIBRILLATOR) IN PLACE: ICD-10-CM

## 2020-01-15 DIAGNOSIS — I25.10 CORONARY ARTERY DISEASE INVOLVING NATIVE CORONARY ARTERY OF NATIVE HEART WITHOUT ANGINA PECTORIS: ICD-10-CM

## 2020-01-15 DIAGNOSIS — I48.91 ATRIAL FIBRILLATION, UNSPECIFIED TYPE (HCC): ICD-10-CM

## 2020-01-15 LAB
AORTIC ROOT ANNULUS: 2.3 CM
ASCENDING AORTA: 3.2 CM
BH CV ECHO MEAS - ACS: 2.5 CM
BH CV ECHO MEAS - AO MAX PG (FULL): 2.9 MMHG
BH CV ECHO MEAS - AO MAX PG: 3.9 MMHG
BH CV ECHO MEAS - AO MEAN PG (FULL): 1.4 MMHG
BH CV ECHO MEAS - AO MEAN PG: 2 MMHG
BH CV ECHO MEAS - AO V2 MAX: 99.1 CM/SEC
BH CV ECHO MEAS - AO V2 MEAN: 66 CM/SEC
BH CV ECHO MEAS - AO V2 VTI: 16.2 CM
BH CV ECHO MEAS - ASC AORTA: 3.2 CM
BH CV ECHO MEAS - AVA(I,A): 1.7 CM^2
BH CV ECHO MEAS - AVA(I,D): 1.7 CM^2
BH CV ECHO MEAS - AVA(V,A): 1.7 CM^2
BH CV ECHO MEAS - AVA(V,D): 1.7 CM^2
BH CV ECHO MEAS - BSA(HAYCOCK): 2.2 M^2
BH CV ECHO MEAS - BSA: 2.2 M^2
BH CV ECHO MEAS - BZI_BMI: 28.1 KILOGRAMS/M^2
BH CV ECHO MEAS - BZI_METRIC_HEIGHT: 185.4 CM
BH CV ECHO MEAS - BZI_METRIC_WEIGHT: 96.6 KG
BH CV ECHO MEAS - EDV(MOD-SP2): 76 ML
BH CV ECHO MEAS - EDV(MOD-SP4): 79 ML
BH CV ECHO MEAS - EDV(TEICH): 58.8 ML
BH CV ECHO MEAS - EF(CUBED): 60.1 %
BH CV ECHO MEAS - EF(MOD-BP): 58 %
BH CV ECHO MEAS - EF(MOD-SP2): 59.2 %
BH CV ECHO MEAS - EF(MOD-SP4): 57 %
BH CV ECHO MEAS - EF(TEICH): 52.4 %
BH CV ECHO MEAS - ESV(MOD-SP2): 31 ML
BH CV ECHO MEAS - ESV(MOD-SP4): 34 ML
BH CV ECHO MEAS - ESV(TEICH): 27.9 ML
BH CV ECHO MEAS - FS: 26.4 %
BH CV ECHO MEAS - IVS/LVPW: 1
BH CV ECHO MEAS - IVSD: 1.1 CM
BH CV ECHO MEAS - LAT PEAK E' VEL: 10 CM/SEC
BH CV ECHO MEAS - LV DIASTOLIC VOL/BSA (35-75): 35.7 ML/M^2
BH CV ECHO MEAS - LV MASS(C)D: 134.2 GRAMS
BH CV ECHO MEAS - LV MASS(C)DI: 60.7 GRAMS/M^2
BH CV ECHO MEAS - LV MAX PG: 1 MMHG
BH CV ECHO MEAS - LV MEAN PG: 0.59 MMHG
BH CV ECHO MEAS - LV SYSTOLIC VOL/BSA (12-30): 15.4 ML/M^2
BH CV ECHO MEAS - LV V1 MAX: 51 CM/SEC
BH CV ECHO MEAS - LV V1 MEAN: 36.4 CM/SEC
BH CV ECHO MEAS - LV V1 VTI: 8.4 CM
BH CV ECHO MEAS - LVIDD: 3.7 CM
BH CV ECHO MEAS - LVIDS: 2.7 CM
BH CV ECHO MEAS - LVLD AP2: 8.4 CM
BH CV ECHO MEAS - LVLD AP4: 8.2 CM
BH CV ECHO MEAS - LVLS AP2: 7.4 CM
BH CV ECHO MEAS - LVLS AP4: 7 CM
BH CV ECHO MEAS - LVOT AREA (M): 3.1 CM^2
BH CV ECHO MEAS - LVOT AREA: 3.2 CM^2
BH CV ECHO MEAS - LVOT DIAM: 2 CM
BH CV ECHO MEAS - LVPWD: 1.1 CM
BH CV ECHO MEAS - MED PEAK E' VEL: 9 CM/SEC
BH CV ECHO MEAS - MR MAX PG: 20.8 MMHG
BH CV ECHO MEAS - MR MAX VEL: 228 CM/SEC
BH CV ECHO MEAS - MV DEC SLOPE: 486.7 CM/SEC^2
BH CV ECHO MEAS - MV DEC TIME: 0.2 SEC
BH CV ECHO MEAS - MV E MAX VEL: 92.1 CM/SEC
BH CV ECHO MEAS - MV MAX PG: 2.8 MMHG
BH CV ECHO MEAS - MV MEAN PG: 1.3 MMHG
BH CV ECHO MEAS - MV P1/2T MAX VEL: 91.7 CM/SEC
BH CV ECHO MEAS - MV P1/2T: 55.2 MSEC
BH CV ECHO MEAS - MV V2 MAX: 84.2 CM/SEC
BH CV ECHO MEAS - MV V2 MEAN: 54.1 CM/SEC
BH CV ECHO MEAS - MV V2 VTI: 24.6 CM
BH CV ECHO MEAS - MVA P1/2T LCG: 2.4 CM^2
BH CV ECHO MEAS - MVA(P1/2T): 4 CM^2
BH CV ECHO MEAS - MVA(VTI): 1.1 CM^2
BH CV ECHO MEAS - PA ACC TIME: 0.13 SEC
BH CV ECHO MEAS - PA MAX PG (FULL): 0.46 MMHG
BH CV ECHO MEAS - PA MAX PG: 1.1 MMHG
BH CV ECHO MEAS - PA PR(ACCEL): 22 MMHG
BH CV ECHO MEAS - PA V2 MAX: 52.9 CM/SEC
BH CV ECHO MEAS - PVA(V,A): 2.9 CM^2
BH CV ECHO MEAS - PVA(V,D): 2.9 CM^2
BH CV ECHO MEAS - QP/QS: 1
BH CV ECHO MEAS - RAP SYSTOLE: 8 MMHG
BH CV ECHO MEAS - RV MAX PG: 0.66 MMHG
BH CV ECHO MEAS - RV MEAN PG: 0.4 MMHG
BH CV ECHO MEAS - RV V1 MAX: 40.7 CM/SEC
BH CV ECHO MEAS - RV V1 MEAN: 30.6 CM/SEC
BH CV ECHO MEAS - RV V1 VTI: 7.4 CM
BH CV ECHO MEAS - RVOT AREA: 3.8 CM^2
BH CV ECHO MEAS - RVOT DIAM: 2.2 CM
BH CV ECHO MEAS - RVSP: 27.3 MMHG
BH CV ECHO MEAS - SI(CUBED): 14 ML/M^2
BH CV ECHO MEAS - SI(LVOT): 12.3 ML/M^2
BH CV ECHO MEAS - SI(MOD-SP2): 20.4 ML/M^2
BH CV ECHO MEAS - SI(MOD-SP4): 20.4 ML/M^2
BH CV ECHO MEAS - SI(TEICH): 13.9 ML/M^2
BH CV ECHO MEAS - SUP REN AO DIAM: 2 CM
BH CV ECHO MEAS - SV(CUBED): 30.8 ML
BH CV ECHO MEAS - SV(LVOT): 27.1 ML
BH CV ECHO MEAS - SV(MOD-SP2): 45 ML
BH CV ECHO MEAS - SV(MOD-SP4): 45 ML
BH CV ECHO MEAS - SV(RVOT): 27.8 ML
BH CV ECHO MEAS - SV(TEICH): 30.8 ML
BH CV ECHO MEAS - TAPSE (>1.6): 1.2 CM2
BH CV ECHO MEAS - TR MAX VEL: 219.9 CM/SEC
BH CV ECHO MEASUREMENTS AVERAGE E/E' RATIO: 9.69
BH CV XLRA - RV BASE: 2.5 CM
BH CV XLRA - RV LENGTH: 7.2 CM
BH CV XLRA - RV MID: 2 CM
BH CV XLRA - TDI S': 10 CM/SEC
LEFT ATRIUM VOLUME INDEX: 19 ML/M2
LV EF 2D ECHO EST: 58 %
SINUS: 3.2 CM
STJ: 3.2 CM

## 2020-01-15 PROCEDURE — 99214 OFFICE O/P EST MOD 30 MIN: CPT | Performed by: INTERNAL MEDICINE

## 2020-01-15 PROCEDURE — 93306 TTE W/DOPPLER COMPLETE: CPT

## 2020-01-15 PROCEDURE — 93000 ELECTROCARDIOGRAM COMPLETE: CPT | Performed by: INTERNAL MEDICINE

## 2020-01-15 PROCEDURE — 93283 PRGRMG EVAL IMPLANTABLE DFB: CPT | Performed by: INTERNAL MEDICINE

## 2020-01-15 PROCEDURE — 93306 TTE W/DOPPLER COMPLETE: CPT | Performed by: INTERNAL MEDICINE

## 2020-01-15 NOTE — PROGRESS NOTES
Date of Office Visit: 01/15/2020  Encounter Provider: Kevan Lugo MD  Place of Service: [facility] CARDIOLOGY  Patient Name: Meet Wood Jr.  : 1940    Subjective:     Encounter Date:01/15/2020      Patient ID: Meet Wood Jr. is a 79 y.o. male who has a cc of  ICM and VT and recent diagnosis of atrial flutter.     Today his echo was totally normal. Normal EF    zio -- average heart rate of 110    He feels fine  Main complaint is his blood sugar variations.       No anginal chest pain,   No sig urias,   No soa,   No fainting,  No orthostasis.   No edema.   Exercise tolerance: he takes the dog about once per week     There have been no hospital admission since the last visit.     There have been no bleeding events.       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    • CAD (coronary artery disease)    • Chronic kidney disease     CKD 3   • Diabetes (CMS/[facility])    • Hyperlipidemia    • Hypertension    • Implantable cardioverter-defibrillator (ICD) in situ    • Ischemic cardiomyopathy    • Myocardial infarction (CMS/[facility])     PTCA only   • Sinus bradycardia        Social History     Socioeconomic History   • Marital status:      Spouse name: Not on file   • Number of children: Not on file   • Years of education: Not on file   • Highest education level: Not on file   Tobacco Use   • Smoking status: Former Smoker   • Tobacco comment: no caffeine use   Substance and Sexual Activity   • Alcohol use: Yes     Comment: rarely       Review of Systems   Constitution: Negative for fever and night sweats.   HENT: Negative for ear pain and stridor.    Eyes: Negative for discharge and visual halos.   Cardiovascular: Negative for cyanosis.   Respiratory: Negative for hemoptysis and sputum production.    Hematologic/Lymphatic: Negative for adenopathy.   Skin: Negative for nail changes and unusual hair  "distribution.   Musculoskeletal: Negative for gout and joint swelling.   Gastrointestinal: Negative for bowel incontinence and flatus.   Genitourinary: Negative for dysuria and flank pain.   Neurological: Negative for seizures and tremors.   Psychiatric/Behavioral: Negative for altered mental status. The patient is not nervous/anxious.             Objective:     Vitals:    01/15/20 1213   BP: 132/88   BP Location: Right arm   Patient Position: Sitting   Cuff Size: Large Adult   Pulse: 102   Weight: 96.2 kg (212 lb)   Height: 185.4 cm (73\")         Physical Exam   Constitutional: He is oriented to person, place, and time.   HENT:   Head: Normocephalic and atraumatic.   Eyes: Right eye exhibits no discharge. Left eye exhibits no discharge.   Neck: No JVD present. No thyromegaly present.   Cardiovascular: Normal rate and regular rhythm. Exam reveals no gallop and no friction rub.   No murmur heard.  Pulmonary/Chest: Effort normal and breath sounds normal. He has no rales.   Abdominal: Soft. Bowel sounds are normal. There is no tenderness.   Musculoskeletal: Normal range of motion. He exhibits no edema or deformity.   Neurological: He is alert and oriented to person, place, and time. He exhibits normal muscle tone.   Skin: Skin is warm and dry. No erythema.   Psychiatric: He has a normal mood and affect. His behavior is normal. Thought content normal.         ECG 12 Lead  Date/Time: 1/15/2020 12:36 PM  Performed by: Kevan Lugo MD  Authorized by: Kevan Lugo MD   Comparison: compared with previous ECG   Similar to previous ECG  Rhythm: atrial flutter    Clinical impression: abnormal EKG            Lab Review:       Assessment:          Diagnosis Plan   1. Ischemic cardiomyopathy     2. Implantable cardioverter-defibrillator (ICD) in situ     3. Essential hypertension     4. Coronary artery disease involving native coronary artery of native heart without angina pectoris     5. Stage 3 chronic kidney disease " (CMS/[facility])     6. ICD (implantable cardioverter-defibrillator) in place            Plan:     He feels well with no symptoms     His exam is normal     The echo is amazing -- great EF.    His avg hr was a bit high at 110 but he was only taking carvedilol once per day     If we go to twice daily then his heart rate will be perfect.     He is doing well on apixaban. He is taking that twice daily.

## 2020-04-21 ENCOUNTER — CLINICAL SUPPORT NO REQUIREMENTS (OUTPATIENT)
Dept: CARDIOLOGY | Facility: CLINIC | Age: 80
End: 2020-04-21

## 2020-04-21 DIAGNOSIS — I47.20 VENTRICULAR TACHYCARDIA (HCC): ICD-10-CM

## 2020-04-21 DIAGNOSIS — I25.5 ISCHEMIC CARDIOMYOPATHY: Primary | ICD-10-CM

## 2020-04-21 DIAGNOSIS — I48.4 ATYPICAL ATRIAL FLUTTER (HCC): ICD-10-CM

## 2020-04-21 PROCEDURE — 93296 REM INTERROG EVL PM/IDS: CPT | Performed by: INTERNAL MEDICINE

## 2020-04-21 PROCEDURE — 93295 DEV INTERROG REMOTE 1/2/MLT: CPT | Performed by: INTERNAL MEDICINE

## 2020-07-08 ENCOUNTER — CLINICAL SUPPORT NO REQUIREMENTS (OUTPATIENT)
Dept: CARDIOLOGY | Facility: CLINIC | Age: 80
End: 2020-07-08

## 2020-07-08 ENCOUNTER — OFFICE VISIT (OUTPATIENT)
Dept: CARDIOLOGY | Facility: CLINIC | Age: 80
End: 2020-07-08

## 2020-07-08 VITALS
SYSTOLIC BLOOD PRESSURE: 110 MMHG | BODY MASS INDEX: 26.9 KG/M2 | HEART RATE: 71 BPM | HEIGHT: 73 IN | WEIGHT: 203 LBS | DIASTOLIC BLOOD PRESSURE: 74 MMHG

## 2020-07-08 DIAGNOSIS — Z95.810 ICD (IMPLANTABLE CARDIOVERTER-DEFIBRILLATOR) IN PLACE: ICD-10-CM

## 2020-07-08 DIAGNOSIS — I25.10 CORONARY ARTERY DISEASE INVOLVING NATIVE CORONARY ARTERY OF NATIVE HEART WITHOUT ANGINA PECTORIS: ICD-10-CM

## 2020-07-08 DIAGNOSIS — I25.5 ISCHEMIC CARDIOMYOPATHY: Primary | ICD-10-CM

## 2020-07-08 DIAGNOSIS — I10 ESSENTIAL HYPERTENSION: ICD-10-CM

## 2020-07-08 PROCEDURE — 93283 PRGRMG EVAL IMPLANTABLE DFB: CPT | Performed by: INTERNAL MEDICINE

## 2020-07-08 PROCEDURE — 99214 OFFICE O/P EST MOD 30 MIN: CPT | Performed by: INTERNAL MEDICINE

## 2020-07-08 PROCEDURE — 93000 ELECTROCARDIOGRAM COMPLETE: CPT | Performed by: INTERNAL MEDICINE

## 2020-07-08 NOTE — PROGRESS NOTES
Date of Office Visit: 2020  Encounter Provider: Kevan Lugo MD  Place of Service: [facility] CARDIOLOGY  Patient Name: Meet Wood Jr.  : 1940    Subjective:     Encounter Date:2020      Patient ID: Meet Wood Jr. is a 80 y.o. male who has a cc  of  ICM and perm AF and history of VT.     He feels well.     The patient had a good year.   No anginal chest pain,   No sig urias,   No soa,   No fainting,  No orthostasis.   No edema.   Exercise tolerance: walks regularly.     There have been no hospital admission since the last visit.     There have been no bleeding events.       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    • CAD (coronary artery disease)    • Chronic kidney disease     CKD 3   • Diabetes (CMS/[facility])    • Hyperlipidemia    • Hypertension    • Implantable cardioverter-defibrillator (ICD) in situ    • Ischemic cardiomyopathy    • Myocardial infarction (CMS/[facility])     PTCA only   • Sinus bradycardia        Social History     Socioeconomic History   • Marital status:      Spouse name: Not on file   • Number of children: Not on file   • Years of education: Not on file   • Highest education level: Not on file   Tobacco Use   • Smoking status: Former Smoker   • Smokeless tobacco: Never Used   • Tobacco comment: no caffeine use   Substance and Sexual Activity   • Alcohol use: Yes     Comment: rarely       Review of Systems   Constitution: Negative for fever and night sweats.   HENT: Negative for ear pain and stridor.    Eyes: Negative for discharge and visual halos.   Cardiovascular: Negative for cyanosis.   Respiratory: Negative for hemoptysis and sputum production.    Hematologic/Lymphatic: Negative for adenopathy.   Skin: Negative for nail changes and unusual hair distribution.   Musculoskeletal: Negative for gout and joint swelling.   Gastrointestinal: Negative for bowel  "incontinence and flatus.   Genitourinary: Negative for dysuria and flank pain.   Neurological: Negative for seizures and tremors.   Psychiatric/Behavioral: Negative for altered mental status. The patient is not nervous/anxious.             Objective:     Vitals:    07/08/20 1115   BP: 110/74   BP Location: Right arm   Pulse: 71   Weight: 92.1 kg (203 lb)   Height: 185.4 cm (73\")         Physical Exam   Constitutional: He is oriented to person, place, and time.   HENT:   Head: Normocephalic and atraumatic.   Eyes: Right eye exhibits no discharge. Left eye exhibits no discharge.   Neck: No JVD present. No thyromegaly present.   Cardiovascular: Normal rate. An irregularly irregular rhythm present. Exam reveals no gallop and no friction rub.   No murmur heard.  Pulmonary/Chest: Effort normal and breath sounds normal. He has no rales.   Abdominal: Soft. Bowel sounds are normal. There is no tenderness.   Musculoskeletal: Normal range of motion. He exhibits no edema or deformity.   Neurological: He is alert and oriented to person, place, and time. He exhibits normal muscle tone.   Skin: Skin is warm and dry. No erythema.   Psychiatric: He has a normal mood and affect. His behavior is normal. Thought content normal.         ECG 12 Lead  Date/Time: 7/8/2020 12:19 PM  Performed by: Kevan Lugo MD  Authorized by: Kevan Lugo MD   Comparison: compared with previous ECG   Similar to previous ECG  Rhythm: atrial fibrillation and paced    Clinical impression: abnormal EKG            Lab Review:       Assessment:          Diagnosis Plan   1. Ischemic cardiomyopathy     2. ICD (implantable cardioverter-defibrillator) in place     3. Coronary artery disease involving native coronary artery of native heart without angina pectoris     4. Essential hypertension            Plan:     AF -- this is asymptomatic and he is on apixaban.     ICM -- EF has normalized. And he is on GDMT   Class 1 symptoms.     I reviewed the " pacemaker/ICD tracings and the pacing and sensing parameters are normal.  AF burden is 100%     He is close to PADMA. But has normal EF now. We may not need to replace.

## 2021-01-15 ENCOUNTER — OFFICE VISIT (OUTPATIENT)
Dept: CARDIOLOGY | Facility: CLINIC | Age: 81
End: 2021-01-15

## 2021-01-15 ENCOUNTER — CLINICAL SUPPORT NO REQUIREMENTS (OUTPATIENT)
Dept: CARDIOLOGY | Facility: CLINIC | Age: 81
End: 2021-01-15

## 2021-01-15 VITALS
HEIGHT: 73 IN | HEART RATE: 94 BPM | WEIGHT: 212 LBS | RESPIRATION RATE: 16 BRPM | OXYGEN SATURATION: 98 % | SYSTOLIC BLOOD PRESSURE: 132 MMHG | BODY MASS INDEX: 28.1 KG/M2 | DIASTOLIC BLOOD PRESSURE: 80 MMHG

## 2021-01-15 DIAGNOSIS — I10 ESSENTIAL HYPERTENSION: ICD-10-CM

## 2021-01-15 DIAGNOSIS — I25.10 CORONARY ARTERY DISEASE INVOLVING NATIVE CORONARY ARTERY OF NATIVE HEART WITHOUT ANGINA PECTORIS: ICD-10-CM

## 2021-01-15 DIAGNOSIS — I25.5 ISCHEMIC CARDIOMYOPATHY: ICD-10-CM

## 2021-01-15 DIAGNOSIS — I25.5 ISCHEMIC CARDIOMYOPATHY: Primary | ICD-10-CM

## 2021-01-15 DIAGNOSIS — Z95.810 ICD (IMPLANTABLE CARDIOVERTER-DEFIBRILLATOR) IN PLACE: ICD-10-CM

## 2021-01-15 DIAGNOSIS — I48.21 PERMANENT ATRIAL FIBRILLATION (HCC): Primary | ICD-10-CM

## 2021-01-15 PROCEDURE — 93000 ELECTROCARDIOGRAM COMPLETE: CPT | Performed by: NURSE PRACTITIONER

## 2021-01-15 PROCEDURE — 93283 PRGRMG EVAL IMPLANTABLE DFB: CPT | Performed by: INTERNAL MEDICINE

## 2021-01-15 PROCEDURE — 99213 OFFICE O/P EST LOW 20 MIN: CPT | Performed by: NURSE PRACTITIONER

## 2021-01-15 RX ORDER — INSULIN ASPART 100 [IU]/ML
1 INJECTION, SUSPENSION SUBCUTANEOUS 2 TIMES DAILY WITH MEALS
COMMUNITY

## 2021-01-15 NOTE — PROGRESS NOTES
Date of Office Visit: 01/15/2021  Encounter Provider: JOSUE Sahu  Place of Service: [facility] CARDIOLOGY  Patient Name: Meet Wood Jr.  :1940    Chief Complaint   Patient presents with   • Cardiomyopathy     6 month follow up    :     HPI: Meet Wood Jr. is a 80 y.o. male who is a patient followed by Dr. Lugo.  He has a history of MI/CAD/PTCA in the remote past (), ischemic cardiomyopathy, status post dual-chamber ICD, hypertension, CKD and diabetes.  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    Presents today for routine follow-up.    He is doing well.     He denies chest pain, dyspnea, PND, orthopnea, dizziness, palpitations or edema.    His device interrogation showed normal testing and function.  He has atrially paced less than 1% and ventricular paced less than 1%, his AT/AF burden is 100%.  He is on apixaban for anticoagulation and asymptomatic.    He had an echocardiogram in 2019 which showed that his EF had normalized, his ICD is close to PADMA, this is his second device and he has never received any therapy he tells me.  Per the last note him and Dr. Lugo discussed that once it did reach elective replacement they may choose not to replace it since he has normal EF and has not had any episodes or therapies.    He is on apixaban for anticoagulation and has not had any bleeding issues.        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    • CAD (coronary artery disease)    • Chronic kidney disease     CKD 3   • Diabetes (CMS/HCC)    • Hyperlipidemia    • Hypertension    • Implantable cardioverter-defibrillator (ICD) in situ    • Ischemic cardiomyopathy    • Myocardial infarction (CMS/HCC)     PTCA only   • Sinus bradycardia        Past Surgical History:   Procedure Laterality Date   • CARDIAC DEFIBRILLATOR PLACEMENT  2009     !st device 2002, 2009 new generator and atrial lead   • CORONARY ANGIOPLASTY  1995       Social History     Socioeconomic History   • Marital status:      Spouse name: Not on file   • Number of children: Not on file   • Years of education: Not on file   • Highest education level: Not on file   Tobacco Use   • Smoking status: Former Smoker   • Smokeless tobacco: Never Used   • Tobacco comment: no caffeine use   Substance and Sexual Activity   • Alcohol use: Yes     Comment: rarely       Family History   Problem Relation Age of Onset   • Heart attack Maternal Grandmother        Review of Systems   Constitution: Negative for chills, fever and malaise/fatigue.   Cardiovascular: Negative for chest pain, dyspnea on exertion, leg swelling, near-syncope, orthopnea, palpitations, paroxysmal nocturnal dyspnea and syncope.   Respiratory: Negative for cough and shortness of breath.    Hematologic/Lymphatic: Negative.    Musculoskeletal: Negative for joint pain, joint swelling and myalgias.   Gastrointestinal: Negative for abdominal pain, diarrhea, melena, nausea and vomiting.   Genitourinary: Negative for frequency and hematuria.   Neurological: Negative for light-headedness, numbness, paresthesias and seizures.   Allergic/Immunologic: Negative.    All other systems reviewed and are negative.      Allergies   Allergen Reactions   • Sulfa Antibiotics Rash         Current Outpatient Medications:   •  apixaban (ELIQUIS) 5 MG tablet tablet, TK 1 T PO Q 12 H, Disp: , Rfl:   •  atorvastatin (LIPITOR) 40 MG tablet, Take 40 mg by mouth Daily., Disp: , Rfl:   •  carvedilol (COREG) 25 MG tablet, Take 25 mg by mouth 2 (Two) Times a Day., Disp: , Rfl:   •  insulin aspart prot-insulin aspart (novoLOG 70/30) (70-30) 100 UNIT/ML injection, Inject 1 Units under the skin into the appropriate area as directed 2 (Two) Times a Day With Meals., Disp: , Rfl:   •  insulin glargine (LANTUS) 100 UNIT/ML injection, Inject 11 Units under the  "skin., Disp: , Rfl:   •  lisinopril (PRINIVIL,ZESTRIL) 10 MG tablet, Take 10 mg by mouth., Disp: , Rfl:   •  Zn-Pyg Afri-Nettle-Saw Palmet (SAW PALMETTO COMPLEX PO), Take  by mouth., Disp: , Rfl:   •  insulin lispro (humaLOG) 100 UNIT/ML injection, Inject 1 Units under the skin., Disp: , Rfl:       Objective:     Vitals:    01/15/21 0920   BP: 132/80   Pulse: 94   Resp: 16   SpO2: 98%   Weight: 96.2 kg (212 lb)   Height: 185.4 cm (73\")     Body mass index is 27.97 kg/m².    PHYSICAL EXAM:    Vitals Reviewed.   General Appearance: No acute distress, well developed and well nourished.   Eyes: Conjunctiva and lids: No erythema, swelling, or discharge. Sclera non-icteric.   HENT: Atraumatic, normocephalic. External eyes, ears, and nose normal.   Respiratory: No signs of respiratory distress. Respiration rhythm and depth normal.   Clear to auscultation. No rales, crackles, rhonchi, or wheezing auscultated.   Cardiovascular:  Heart Rate and Rhythm: Irregularly, irregular.  Heart Sounds: S1 and S2.   Murmurs: No murmurs noted. No rubs, thrills, or gallops.   Arterial Pulses:  Posterior tibialis and dorsalis pedis pulses normal.   Lower Extremities: No edema noted.  Gastrointestinal:  Abdomen soft, non-distended, non-tender.   Musculoskeletal: Normal movement of extremities  Skin and Nails: General appearance normal. No pallor, cyanosis, diaphoresis. Skin temperature normal. No clubbing of fingernails.   Psychiatric: Patient alert and oriented to person, place, and time. Speech and behavior appropriate. Normal mood and affect.       ECG 12 Lead    Date/Time: 1/15/2021 9:51 AM  Performed by: Rubina Guadalupe APRN  Authorized by: Rubina Guadalupe APRN   Comparison: compared with previous ECG   Similar to previous ECG  Rhythm: atrial fibrillation  BPM: 94                Assessment:       Diagnosis Plan   1. Permanent atrial fibrillation (CMS/HCC)     2. Coronary artery disease involving native coronary artery of native heart " without angina pectoris  ECG 12 Lead   3. Ischemic cardiomyopathy  ECG 12 Lead   4. ICD (implantable cardioverter-defibrillator) in place     5. Essential hypertension            Plan:       1.  Permanent A. fib, heart rate controlled on carvedilol and he is on apixaban for anticoagulation.  He is asymptomatic.    2.  CAD status post PTCA in the remote past.  No angina.    3.  Ischemic cardiomyopathy, EF has normalized by echo in 12/2019 he is on guideline directed medical therapy.    4.  ICD, normal testing and function.  He is close to PADMA and has never had any therapies, this is his second device, with his normal EF, per previous notes by Dr. Lugo may consider not replacing once it reaches elective replacement.    5.  Hypertension, controlled.    We will have a remote check in 3 months although I suspect it may go PADMA prior to that and follow-up with Dr. West in 6 months with an in clinic device check.    As always, it has been a pleasure to participate in your patient's care.      Sincerely,         JOSUE Torres

## 2021-03-19 ENCOUNTER — TELEPHONE (OUTPATIENT)
Dept: CARDIOLOGY | Facility: CLINIC | Age: 81
End: 2021-03-19

## 2021-03-19 NOTE — TELEPHONE ENCOUNTER
"Pt with [employer] Tel[employer] ICD programmed DDIR for ICM. Also Hx Persistent AF. Device reached PADMA 3/12/21. Per your note 7/8 \"He is close to PADMA. But has normal EF now. We may not need to replace.\"  Guidant 4136 RV wire is not MRI conditional. Per device [employer] counters there is 1 treated event. This may have been DFTs. Pt does not recall ever having therapy.  NS        "

## 2021-04-05 ENCOUNTER — OFFICE (OUTPATIENT)
Dept: URBAN - METROPOLITAN AREA CLINIC 75 | Facility: CLINIC | Age: 81
End: 2021-04-05

## 2021-04-05 VITALS
DIASTOLIC BLOOD PRESSURE: 70 MMHG | OXYGEN SATURATION: 96 % | TEMPERATURE: 97.4 F | RESPIRATION RATE: 12 BRPM | HEART RATE: 78 BPM | SYSTOLIC BLOOD PRESSURE: 116 MMHG | WEIGHT: 212 LBS

## 2021-04-05 DIAGNOSIS — Z80.9 FAMILY HISTORY OF MALIGNANT NEOPLASM, UNSPECIFIED: ICD-10-CM

## 2021-04-05 DIAGNOSIS — Z86.010 PERSONAL HISTORY OF COLONIC POLYPS: ICD-10-CM

## 2021-04-05 PROCEDURE — 99214 OFFICE O/P EST MOD 30 MIN: CPT | Performed by: INTERNAL MEDICINE

## 2021-04-07 ENCOUNTER — TELEPHONE (OUTPATIENT)
Dept: CARDIOLOGY | Facility: CLINIC | Age: 81
End: 2021-04-07

## 2021-04-07 NOTE — TELEPHONE ENCOUNTER
Pt is scheduling a colonoscopy with Dr. Chacon. He wants pt to hold his apixaban 2 days prior to procedure. Pt has NO history of stroke or valve replacement...Ama

## 2021-04-12 PROCEDURE — 93295 DEV INTERROG REMOTE 1/2/MLT: CPT | Performed by: INTERNAL MEDICINE

## 2021-04-12 PROCEDURE — 93296 REM INTERROG EVL PM/IDS: CPT | Performed by: INTERNAL MEDICINE

## 2021-04-14 ENCOUNTER — OFFICE VISIT (OUTPATIENT)
Dept: CARDIOLOGY | Facility: CLINIC | Age: 81
End: 2021-04-14

## 2021-04-14 ENCOUNTER — CLINICAL SUPPORT NO REQUIREMENTS (OUTPATIENT)
Dept: CARDIOLOGY | Facility: CLINIC | Age: 81
End: 2021-04-14

## 2021-04-14 VITALS
BODY MASS INDEX: 27.57 KG/M2 | SYSTOLIC BLOOD PRESSURE: 136 MMHG | HEART RATE: 66 BPM | DIASTOLIC BLOOD PRESSURE: 72 MMHG | HEIGHT: 73 IN | WEIGHT: 208 LBS

## 2021-04-14 DIAGNOSIS — I10 ESSENTIAL HYPERTENSION: ICD-10-CM

## 2021-04-14 DIAGNOSIS — I25.5 ISCHEMIC CARDIOMYOPATHY: Primary | ICD-10-CM

## 2021-04-14 DIAGNOSIS — I48.21 PERMANENT ATRIAL FIBRILLATION (HCC): ICD-10-CM

## 2021-04-14 DIAGNOSIS — I25.10 CORONARY ARTERY DISEASE INVOLVING NATIVE CORONARY ARTERY OF NATIVE HEART WITHOUT ANGINA PECTORIS: ICD-10-CM

## 2021-04-14 DIAGNOSIS — Z95.810 ICD (IMPLANTABLE CARDIOVERTER-DEFIBRILLATOR) IN PLACE: ICD-10-CM

## 2021-04-14 PROCEDURE — 99214 OFFICE O/P EST MOD 30 MIN: CPT | Performed by: INTERNAL MEDICINE

## 2021-04-14 PROCEDURE — 93000 ELECTROCARDIOGRAM COMPLETE: CPT | Performed by: INTERNAL MEDICINE

## 2021-04-14 PROCEDURE — 93283 PRGRMG EVAL IMPLANTABLE DFB: CPT | Performed by: INTERNAL MEDICINE

## 2021-04-14 RX ORDER — PROCHLORPERAZINE 25 MG/1
SUPPOSITORY RECTAL
COMMUNITY
Start: 2021-04-08

## 2021-04-14 NOTE — PROGRESS NOTES
Date of Office Visit: 2021  Encounter Provider: Kevan Lugo MD  Place of Service: [facility] CARDIOLOGY  Patient Name: Meet Wood Jr.  : 1940    Subjective:     Encounter Date:2021      Patient ID: Meet Wood Jr. is a 81 y.o. male who has a cc of  Perm af and CAD and stents and an ICD.     WPW ablation two decades ago.     He's never had a shock.     He now has a normal EF.     He feels good.     He has DM and struggles with blood sugars.   No anginal chest pain,   No sig urias,   No soa,   No fainting,  No orthostasis.   No edema.   Exercise tolerance: good.     He does a lot of work on stained glass.     There have been no hospital admission since the last visit.     There have been no bleeding events.       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    • CAD (coronary artery disease)    • Chronic kidney disease     CKD 3   • Diabetes (CMS/[facility])    • Hyperlipidemia    • Hypertension    • Implantable cardioverter-defibrillator (ICD) in situ    • Ischemic cardiomyopathy    • Myocardial infarction (CMS/[facility])     PTCA only   • Sinus bradycardia        Social History     Socioeconomic History   • Marital status:      Spouse name: Not on file   • Number of children: Not on file   • Years of education: Not on file   • Highest education level: Not on file   Tobacco Use   • Smoking status: Former Smoker   • Smokeless tobacco: Never Used   • Tobacco comment: no caffeine use   Vaping Use   • Vaping Use: Never used   Substance and Sexual Activity   • Alcohol use: Yes     Comment: rarely       Family History   Problem Relation Age of Onset   • Heart attack Maternal Grandmother        Review of Systems   Constitutional: Negative for fever and night sweats.   HENT: Negative for ear pain and stridor.    Eyes: Negative for discharge and visual halos.   Cardiovascular: Negative for cyanosis.   Respiratory:  "Negative for hemoptysis and sputum production.    Hematologic/Lymphatic: Negative for adenopathy.   Skin: Negative for nail changes and unusual hair distribution.   Musculoskeletal: Positive for arthritis and joint pain. Negative for gout and joint swelling.   Gastrointestinal: Negative for bowel incontinence and flatus.   Genitourinary: Negative for dysuria and flank pain.   Neurological: Negative for seizures and tremors.   Psychiatric/Behavioral: Negative for altered mental status. The patient is not nervous/anxious.             Objective:     Vitals:    04/14/21 1259   BP: 136/72   Pulse: 66   Weight: 94.3 kg (208 lb)   Height: 185.4 cm (73\")         Eyes:      General:         Right eye: No discharge.         Left eye: No discharge.   HENT:      Head: Normocephalic and atraumatic.   Neck:      Thyroid: No thyromegaly.      Vascular: No JVD.   Pulmonary:      Effort: Pulmonary effort is normal.      Breath sounds: Normal breath sounds. No rales.   Cardiovascular:      Normal rate. Irregularly irregular rhythm.      No gallop.   Edema:     Peripheral edema absent.   Abdominal:      General: Bowel sounds are normal.      Palpations: Abdomen is soft.      Tenderness: There is no abdominal tenderness.   Musculoskeletal: Normal range of motion.         General: No deformity. Skin:     General: Skin is warm and dry.      Findings: No erythema.   Neurological:      Mental Status: Alert and oriented to person, place, and time.      Motor: Normal muscle tone.   Psychiatric:         Behavior: Behavior normal.         Thought Content: Thought content normal.           ECG 12 Lead    Date/Time: 4/14/2021 1:21 PM  Performed by: Kevan Lugo MD  Authorized by: Kevan Lugo MD   Comparison: compared with previous ECG   Similar to previous ECG  Rhythm: atrial fibrillation  Conduction: left anterior fascicular block            Lab Review:       Assessment:          Diagnosis Plan   1. Ischemic cardiomyopathy     2. ICD " (implantable cardioverter-defibrillator) in place     3. Essential hypertension     4. Permanent atrial fibrillation (CMS/HCC)     5. Coronary artery disease involving native coronary artery of native heart without angina pectoris            Plan:     He has a normal   19 years of never having a shock   No symptoms.  0% pacing     I would strongly rec no surgery to replace due to the lack of indication based on EF and the potential risks of infection with 19 year old leads.     He is most concerned about his blood sugars.     He agrees.

## 2021-04-30 ENCOUNTER — TELEPHONE (OUTPATIENT)
Dept: CARDIOLOGY | Facility: CLINIC | Age: 81
End: 2021-04-30

## 2021-04-30 NOTE — TELEPHONE ENCOUNTER
Pt with [employer] Tele[employer] ICD post WPW VT. Device has reached RRT as of 4/23. Per your ov note 4/14, it appears you have already discussed replacement with him and do not plan to replace. Just wanted to let you know he was officially at replacement. We will cont to monitor until EOS. Let us know if the plan has changed.  NS

## 2021-07-12 PROCEDURE — 93296 REM INTERROG EVL PM/IDS: CPT | Performed by: INTERNAL MEDICINE

## 2021-07-12 PROCEDURE — 93295 DEV INTERROG REMOTE 1/2/MLT: CPT | Performed by: INTERNAL MEDICINE

## 2021-09-15 ENCOUNTER — TELEPHONE (OUTPATIENT)
Dept: CARDIOLOGY | Facility: CLINIC | Age: 81
End: 2021-09-15

## 2021-09-15 NOTE — TELEPHONE ENCOUNTER
[facility]  Lat[employer] Alert received notifying that ICD remote monitoring was disabled 9/15 due to limited battery capacity. Battery nearing EOS. This was expected and per April ov notes, will not replace battery and just follow until EOS.     Currently pt is sched for clinic with EDD SALAS appt 12/6 will check for EOS at that time. Attempted to call pt to inform him and ask that he unplug his remote. No answer. Msg left.  NS

## 2021-10-11 PROCEDURE — 93295 DEV INTERROG REMOTE 1/2/MLT: CPT | Performed by: INTERNAL MEDICINE

## 2021-10-11 PROCEDURE — 93296 REM INTERROG EVL PM/IDS: CPT | Performed by: INTERNAL MEDICINE

## 2021-12-06 ENCOUNTER — OFFICE VISIT (OUTPATIENT)
Dept: CARDIOLOGY | Facility: CLINIC | Age: 81
End: 2021-12-06

## 2021-12-06 ENCOUNTER — CLINICAL SUPPORT NO REQUIREMENTS (OUTPATIENT)
Dept: CARDIOLOGY | Facility: CLINIC | Age: 81
End: 2021-12-06

## 2021-12-06 VITALS
BODY MASS INDEX: 28.23 KG/M2 | HEIGHT: 73 IN | DIASTOLIC BLOOD PRESSURE: 78 MMHG | WEIGHT: 213 LBS | SYSTOLIC BLOOD PRESSURE: 134 MMHG | HEART RATE: 64 BPM

## 2021-12-06 DIAGNOSIS — I10 ESSENTIAL HYPERTENSION: ICD-10-CM

## 2021-12-06 DIAGNOSIS — I48.21 PERMANENT ATRIAL FIBRILLATION (HCC): Primary | ICD-10-CM

## 2021-12-06 DIAGNOSIS — I25.5 ISCHEMIC CARDIOMYOPATHY: Primary | ICD-10-CM

## 2021-12-06 DIAGNOSIS — I25.10 CORONARY ARTERY DISEASE INVOLVING NATIVE CORONARY ARTERY OF NATIVE HEART WITHOUT ANGINA PECTORIS: ICD-10-CM

## 2021-12-06 DIAGNOSIS — Z95.810 ICD (IMPLANTABLE CARDIOVERTER-DEFIBRILLATOR) IN PLACE: ICD-10-CM

## 2021-12-06 DIAGNOSIS — I25.5 ISCHEMIC CARDIOMYOPATHY: ICD-10-CM

## 2021-12-06 PROCEDURE — 99213 OFFICE O/P EST LOW 20 MIN: CPT | Performed by: NURSE PRACTITIONER

## 2021-12-06 PROCEDURE — 93000 ELECTROCARDIOGRAM COMPLETE: CPT | Performed by: NURSE PRACTITIONER

## 2021-12-06 NOTE — PROGRESS NOTES
Date of Office Visit: 2021  Encounter Provider: JOSUE Sahu  Place of Service: [facility] CARDIOLOGY  Patient Name: Meet Wood Jr.  :1940    Chief Complaint   Patient presents with   • permanent AFIB   • Coronary Artery Disease   • Hypertension   • ICD check     EOS   :     HPI: Meet Wood Jr. is a 81 y.o. male who follows with Dr. Lugo.  He has permanent AF, CAD, status post stent and primary prevention ICD.  He also had WPW ablation in the remote past.    He saw Dr. Lugo in April of this year, his device is PADMA, he has never had any shocks and he had an echo in 2020 and his EF was normal.  They discussed and decided not to replace his ICD.    He presents today for routine follow-up.    He is doing well.  He denies chest pain, dyspnea, PND, orthopnea, dizziness, edema or palpitations.    He remains on apixaban for anticoagulation with no bleeding issues.    Device was interrogated today, and has limited capability since it is basically end of service, his wife says she has heard it beeping on occasion, we did check that and it appears to be off as far as the beeping alert for end of service.     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    • CAD (coronary artery disease)    • Chronic kidney disease     CKD 3   • Diabetes (HCC)    • Hyperlipidemia    • Hypertension    • Implantable cardioverter-defibrillator (ICD) in situ    • Ischemic cardiomyopathy    • Myocardial infarction (HCC)     PTCA only   • Permanent atrial fibrillation (HCC)    • Sinus bradycardia        Past Surgical History:   Procedure Laterality Date   • CARDIAC DEFIBRILLATOR PLACEMENT  2009    !st device ,  new generator and atrial lead   • CORONARY ANGIOPLASTY         Social History     Socioeconomic History   • Marital status:    Tobacco Use   • Smoking status: Former Smoker    • Smokeless tobacco: Never Used   • Tobacco comment: no caffeine use   Vaping Use   • Vaping Use: Never used   Substance and Sexual Activity   • Alcohol use: Yes     Comment: rarely       Family History   Problem Relation Age of Onset   • Heart attack Maternal Grandmother        Review of Systems   Constitutional: Negative for chills, fever and malaise/fatigue.   Cardiovascular: Negative for chest pain, dyspnea on exertion, leg swelling, near-syncope, orthopnea, palpitations, paroxysmal nocturnal dyspnea and syncope.   Respiratory: Negative for cough and shortness of breath.    Hematologic/Lymphatic: Negative.    Musculoskeletal: Negative for joint pain, joint swelling and myalgias.   Gastrointestinal: Negative for abdominal pain, diarrhea, melena, nausea and vomiting.   Genitourinary: Negative for frequency and hematuria.   Neurological: Negative for light-headedness, numbness, paresthesias and seizures.   Allergic/Immunologic: Negative.    All other systems reviewed and are negative.      Allergies   Allergen Reactions   • Sulfa Antibiotics Rash         Current Outpatient Medications:   •  apixaban (ELIQUIS) 5 MG tablet tablet, TK 1 T PO Q 12 H, Disp: , Rfl:   •  atorvastatin (LIPITOR) 40 MG tablet, Take 40 mg by mouth Daily., Disp: , Rfl:   •  carvedilol (COREG) 25 MG tablet, Take 25 mg by mouth 2 (Two) Times a Day., Disp: , Rfl:   •  Continuous Blood Gluc Sensor (Dexcom G6 Sensor), CHANGE EVERY 10 DAYS, Disp: , Rfl:   •  Diclofenac Sodium (VOLTAREN) 1 % gel gel, Apply 2 g topically to the appropriate area as directed 3 (Three) Times a Day., Disp: , Rfl:   •  insulin aspart prot-insulin aspart (novoLOG 70/30) (70-30) 100 UNIT/ML injection, Inject 1 Units under the skin into the appropriate area as directed 2 (Two) Times a Day With Meals., Disp: , Rfl:   •  insulin glargine (LANTUS) 100 UNIT/ML injection, Inject 11 Units under the skin., Disp: , Rfl:   •  lisinopril (PRINIVIL,ZESTRIL) 10 MG tablet, Take 10 mg by  "mouth., Disp: , Rfl:   •  Zn-Pyg Afri-Nettle-Saw Palmet (SAW PALMETTO COMPLEX PO), Take  by mouth., Disp: , Rfl:       Objective:     Vitals:    12/06/21 1119   BP: 134/78   Pulse: 64   Weight: 96.6 kg (213 lb)   Height: 185.4 cm (73\")     Body mass index is 28.1 kg/m².    PHYSICAL EXAM:    Vitals Reviewed.   General Appearance: No acute distress, well developed and well nourished.   Eyes: Conjunctiva and lids: No erythema, swelling, or discharge. Sclera non-icteric.   HENT: Atraumatic, normocephalic. External eyes, ears, and nose normal.   Respiratory: No signs of respiratory distress. Respiration rhythm and depth normal.   Clear to auscultation. No rales, crackles, rhonchi, or wheezing auscultated.   Cardiovascular:  Heart Rate and Rhythm:, Irregular.  Heart Sounds: S1 and S2.  Murmurs: No murmurs noted. No rubs, thrills, or gallops.   Arterial Pulses:  Posterior tibialis and dorsalis pedis pulses normal.   Lower Extremities: No edema noted.  Gastrointestinal:  Abdomen soft, non-distended, non-tender.   Musculoskeletal: Normal movement of extremities  Skin and Nails: General appearance normal. No pallor, cyanosis, diaphoresis. Skin temperature normal. No clubbing of fingernails.   Psychiatric: Patient alert and oriented to person, place, and time. Speech and behavior appropriate. Normal mood and affect.       ECG 12 Lead    Date/Time: 12/6/2021 11:20 AM  Performed by: Rubina Guadalupe APRN  Authorized by: Rubina Guadalupe APRN   Comparison: compared with previous ECG   Similar to previous ECG  Rhythm: atrial fibrillation  BPM: 64                Assessment:       Diagnosis Plan   1. Permanent atrial fibrillation (HCC)     2. Coronary artery disease involving native coronary artery of native heart without angina pectoris     3. Essential hypertension     4. Ischemic cardiomyopathy     5. ICD (implantable cardioverter-defibrillator) in place            Plan:       1.  Permanent A. fib F, heart rate well controlled on " carvedilol.  Remains on apixaban for anticoagulation.    2.  CAD, status post stents in the past.  He is doing well with no angina.    3.  Hypertension, controlled.    4.-5.  Ischemic cardiomyopathy, status post primary prevention ICD many years ago.  His device is now end of service, he had a normal echo in January 2020 he has been on GDMT and done well and has never had any shocks, he discussed with Dr. Lugo and via shared decision they decided not to replace his device as it was no longer indicated.    Follow-up with Dr. Lugo in 6 months.    As always, it has been a pleasure to participate in your patient's care.      Sincerely,         JOSUE Torres

## 2022-03-17 ENCOUNTER — TELEPHONE (OUTPATIENT)
Dept: CARDIOLOGY | Facility: CLINIC | Age: 82
End: 2022-03-17

## 2022-03-17 NOTE — TELEPHONE ENCOUNTER
Does he creat typically run above 1.5 or is it just one recent reading?    If consistently above 1.5 then fine to decrease to 2.5mg BID dose

## 2022-03-17 NOTE — TELEPHONE ENCOUNTER
Vianey, pharmacist with [facility] called regarding patient's eliquis dose.    He turned 82 yesterday, and with most recent creatinine being greater than 1.5 with a weight of 97kg, she is recommending a dose change to the 2.5mg tabs.    Please review and advise.

## 2022-03-30 NOTE — TELEPHONE ENCOUNTER
Patient and his wife have been informed.    They are going to cut 5mg tabs in half for now, and call when ready for script for 2.5mg tabs.

## 2022-03-30 NOTE — TELEPHONE ENCOUNTER
Spoke with patient.  He states that his creatinine has been 1.7 the last two draws, which were 2/1 & 10/13.        Should his dose be reduced to 2.5mg BID?

## 2022-07-18 ENCOUNTER — OFFICE VISIT (OUTPATIENT)
Dept: CARDIOLOGY | Facility: CLINIC | Age: 82
End: 2022-07-18

## 2022-07-18 VITALS
WEIGHT: 210 LBS | SYSTOLIC BLOOD PRESSURE: 128 MMHG | HEIGHT: 73 IN | HEART RATE: 62 BPM | BODY MASS INDEX: 27.83 KG/M2 | DIASTOLIC BLOOD PRESSURE: 82 MMHG

## 2022-07-18 DIAGNOSIS — I48.21 PERMANENT ATRIAL FIBRILLATION: ICD-10-CM

## 2022-07-18 DIAGNOSIS — I25.5 ISCHEMIC CARDIOMYOPATHY: Primary | ICD-10-CM

## 2022-07-18 DIAGNOSIS — I10 ESSENTIAL HYPERTENSION: ICD-10-CM

## 2022-07-18 DIAGNOSIS — Z95.810 ICD (IMPLANTABLE CARDIOVERTER-DEFIBRILLATOR) IN PLACE: ICD-10-CM

## 2022-07-18 PROCEDURE — 93000 ELECTROCARDIOGRAM COMPLETE: CPT | Performed by: INTERNAL MEDICINE

## 2022-07-18 PROCEDURE — 99214 OFFICE O/P EST MOD 30 MIN: CPT | Performed by: INTERNAL MEDICINE

## 2022-07-18 NOTE — PROGRESS NOTES
Date of Office Visit: 2022  Encounter Provider: Kevan Lugo MD  Place of Service: [facility] CARDIOLOGY  Patient Name: Meet Wood Jr.  : 1940    Subjective:     Encounter Date:2022      Patient ID: Meet Wood Jr. is a 82 y.o. male who has a cc of  ICM and perm AF and prim prev ICD that we decided not to replace.     He had a good 6 month. Main problem is hip and leg pain.     The patient had a good year.   No anginal chest pain,   No sig urias,   No soa,   No fainting,  No orthostasis.   No edema.   Exercise tolerance: limited by leg pain.     There have been no hospital admission since the last visit.     There have been no bleeding events.       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    • CAD (coronary artery disease)    • Chronic kidney disease     CKD 3   • Diabetes (HCC)    • Hyperlipidemia    • Hypertension    • Implantable cardioverter-defibrillator (ICD) in situ    • Ischemic cardiomyopathy    • Myocardial infarction (HCC)     PTCA only   • Permanent atrial fibrillation (HCC)    • Sinus bradycardia        Social History     Socioeconomic History   • Marital status:    Tobacco Use   • Smoking status: Former Smoker   • Smokeless tobacco: Never Used   • Tobacco comment: no caffeine use   Vaping Use   • Vaping Use: Never used   Substance and Sexual Activity   • Alcohol use: Yes     Comment: rarely       Family History   Problem Relation Age of Onset   • Heart attack Maternal Grandmother        Review of Systems   Constitutional: Negative for fever and night sweats.   HENT: Negative for ear pain and stridor.    Eyes: Negative for discharge and visual halos.   Cardiovascular: Negative for cyanosis.   Respiratory: Negative for hemoptysis and sputum production.    Hematologic/Lymphatic: Negative for adenopathy.   Skin: Negative for nail changes and unusual hair distribution.  "  Musculoskeletal: Positive for arthritis and joint pain. Negative for gout and joint swelling.   Gastrointestinal: Negative for bowel incontinence and flatus.   Genitourinary: Negative for dysuria and flank pain.   Neurological: Negative for seizures and tremors.   Psychiatric/Behavioral: Negative for altered mental status. The patient is not nervous/anxious.             Objective:     Vitals:    07/18/22 1135   BP: 128/82   BP Location: Right arm   Patient Position: Sitting   Cuff Size: Adult   Pulse: 62   Weight: 95.3 kg (210 lb)   Height: 185.4 cm (73\")         Eyes:      General:         Right eye: No discharge.         Left eye: No discharge.   HENT:      Head: Normocephalic and atraumatic.   Neck:      Thyroid: No thyromegaly.      Vascular: No JVD.   Pulmonary:      Effort: Pulmonary effort is normal.      Breath sounds: Normal breath sounds. No rales.   Cardiovascular:      Normal rate. Regular rhythm.      No gallop.   Edema:     Peripheral edema absent.   Abdominal:      General: Bowel sounds are normal.      Palpations: Abdomen is soft.      Tenderness: There is no abdominal tenderness.   Musculoskeletal: Normal range of motion.         General: No deformity. Skin:     General: Skin is warm and dry.      Findings: No erythema.   Neurological:      Mental Status: Alert and oriented to person, place, and time.      Motor: Normal muscle tone.   Psychiatric:         Behavior: Behavior normal.         Thought Content: Thought content normal.           ECG 12 Lead    Date/Time: 7/18/2022 11:56 AM  Performed by: Kevan Lugo MD  Authorized by: Kevan Lugo MD   Comparison: compared with previous ECG   Similar to previous ECG  Rhythm: atrial flutter and atrial fibrillation            Lab Review:       Assessment:          Diagnosis Plan   1. Ischemic cardiomyopathy     2. ICD (implantable cardioverter-defibrillator) in place     3. Essential hypertension     4. Permanent atrial fibrillation (HCC)          "   Plan:       AF -- no symptoms. Rate controlled. On 2.5 of a-ban based on creat >1.5 and age > 80.     HTN -- controlled     ICM - on GMDT and clearly no HF. Class 1. #F in 2020 -> normal EF.

## 2023-02-20 ENCOUNTER — OFFICE VISIT (OUTPATIENT)
Dept: CARDIOLOGY | Facility: CLINIC | Age: 83
End: 2023-02-20
Payer: MEDICARE

## 2023-02-20 VITALS
HEIGHT: 73 IN | SYSTOLIC BLOOD PRESSURE: 128 MMHG | DIASTOLIC BLOOD PRESSURE: 76 MMHG | WEIGHT: 208 LBS | BODY MASS INDEX: 27.57 KG/M2 | HEART RATE: 63 BPM

## 2023-02-20 DIAGNOSIS — I25.5 ISCHEMIC CARDIOMYOPATHY: Primary | ICD-10-CM

## 2023-02-20 DIAGNOSIS — I48.21 PERMANENT ATRIAL FIBRILLATION: ICD-10-CM

## 2023-02-20 DIAGNOSIS — Z95.810 ICD (IMPLANTABLE CARDIOVERTER-DEFIBRILLATOR) IN PLACE: ICD-10-CM

## 2023-02-20 DIAGNOSIS — I25.10 CORONARY ARTERY DISEASE INVOLVING NATIVE CORONARY ARTERY OF NATIVE HEART WITHOUT ANGINA PECTORIS: ICD-10-CM

## 2023-02-20 DIAGNOSIS — I10 ESSENTIAL HYPERTENSION: ICD-10-CM

## 2023-02-20 PROCEDURE — 93000 ELECTROCARDIOGRAM COMPLETE: CPT | Performed by: NURSE PRACTITIONER

## 2023-02-20 PROCEDURE — 99213 OFFICE O/P EST LOW 20 MIN: CPT | Performed by: NURSE PRACTITIONER

## 2023-02-20 RX ORDER — GLUCAGON INJECTION, SOLUTION 1 MG/.2ML
0.2 INJECTION, SOLUTION SUBCUTANEOUS 2 TIMES DAILY
COMMUNITY
Start: 2022-10-19

## 2023-02-20 NOTE — PROGRESS NOTES
Date of Office Visit: 2023  Encounter Provider: JOSUE Sahu  Place of Service: [facility] CARDIOLOGY  Patient Name: Meet Wood Jr.  :1940    Chief Complaint   Patient presents with   • Cardiomyopathy     6 month f/u    • Hypertension          • permanent AFIB   • Pacemaker Check   :     HPI: Meet Wood Jr. is a 82 y.o. male who follows with Dr. Lugo--- permanent A-fib, CAD, status post PCI and primary prevention ICD.  He also has a history of WPW ablation in the remote past.    His device was PADMA in 2021, he has never had any shocks and an echo in  showed that his EF was normal, they had discussed and made a shared decision not to replace his ICD.    Presents today for routine follow-up.    Doing well except for right foot injury (mechanical). Has been 7 weeks---following with wound care and foot doctor. Still in a shoe.     From cardiac standpoint doing well, no chest pain, dyspnea, PND, orthopnea or palpitations.     Apixaban for AC.        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    • CAD (coronary artery disease)    • Chronic kidney disease     CKD 3   • Diabetes (HCC)    • Hyperlipidemia    • Hypertension    • Implantable cardioverter-defibrillator (ICD) in situ    • Ischemic cardiomyopathy    • Myocardial infarction (HCC)     PTCA only   • Permanent atrial fibrillation (HCC)    • Sinus bradycardia        Past Surgical History:   Procedure Laterality Date   • CARDIAC DEFIBRILLATOR PLACEMENT  2009    !st device 2009 new generator and atrial lead   • CORONARY ANGIOPLASTY         Social History     Socioeconomic History   • Marital status:    Tobacco Use   • Smoking status: Former   • Smokeless tobacco: Never   • Tobacco comments:     no caffeine use   Vaping Use   • Vaping Use: Never used   Substance and Sexual Activity   • Alcohol use: Yes      Comment: rarely   • Drug use: Never   • Sexual activity: Defer       Family History   Problem Relation Age of Onset   • Heart attack Maternal Grandmother        Review of Systems   Constitutional: Negative for chills, fever and malaise/fatigue.   Cardiovascular: Negative for chest pain, dyspnea on exertion, leg swelling, near-syncope, orthopnea, palpitations, paroxysmal nocturnal dyspnea and syncope.   Respiratory: Negative for cough and shortness of breath.    Hematologic/Lymphatic: Negative.    Musculoskeletal: Negative for joint pain, joint swelling and myalgias.        Foot pain   Gastrointestinal: Negative for abdominal pain, diarrhea, melena, nausea and vomiting.   Genitourinary: Negative for frequency and hematuria.   Neurological: Negative for light-headedness, numbness, paresthesias and seizures.   Allergic/Immunologic: Negative.    All other systems reviewed and are negative.      Allergies   Allergen Reactions   • Sulfa Antibiotics Rash         Current Outpatient Medications:   •  apixaban (ELIQUIS) 2.5 MG tablet tablet, Take 2.5 mg by mouth 2 (Two) Times a Day. New dose as of 3/30/22., Disp: , Rfl:   •  atorvastatin (LIPITOR) 40 MG tablet, Take 40 mg by mouth Daily., Disp: , Rfl:   •  carvedilol (COREG) 25 MG tablet, Take 25 mg by mouth 2 (Two) Times a Day., Disp: , Rfl:   •  Continuous Blood Gluc Sensor (Dexcom G6 Sensor), CHANGE EVERY 10 DAYS, Disp: , Rfl:   •  Diclofenac Sodium (VOLTAREN) 1 % gel gel, Apply 2 g topically to the appropriate area as directed 3 (Three) Times a Day., Disp: , Rfl:   •  Glucagon (Gvoke HypoPen 2-Pack) 1 MG/0.2ML solution auto-injector, Inject 0.2 mg under the skin into the appropriate area as directed 2 (Two) Times a Day., Disp: , Rfl:   •  insulin aspart prot-insulin aspart (novoLOG 70/30) (70-30) 100 UNIT/ML injection, Inject 1 Units under the skin into the appropriate area as directed 2 (Two) Times a Day With Meals., Disp: , Rfl:   •  insulin glargine (LANTUS) 100  "UNIT/ML injection, Inject 11 Units under the skin., Disp: , Rfl:   •  lisinopril (PRINIVIL,ZESTRIL) 10 MG tablet, Take 10 mg by mouth., Disp: , Rfl:       Objective:     Vitals:    02/20/23 0940   BP: 128/76   Pulse: 63   Weight: 94.3 kg (208 lb)   Height: 185.4 cm (73\")     Body mass index is 27.44 kg/m².    PHYSICAL EXAM:    Vitals Reviewed.   General Appearance: No acute distress, well developed and well nourished.   Eyes: Conjunctiva and lids: No erythema, swelling, or discharge. Sclera non-icteric.   HENT: Atraumatic, normocephalic. External eyes, ears, and nose normal.   Respiratory: No signs of respiratory distress. Respiration rhythm and depth normal.   Clear to auscultation. No rales, crackles, rhonchi, or wheezing auscultated.   Cardiovascular:  Heart Rate and Rhythm: Irregularly, irregular. Heart Sounds:  S1 and S2.   Murmurs: No murmurs noted. No rubs, thrills, or gallops.   Lower Extremities: Right foot.  Gastrointestinal:  Abdomen soft, non-distended, non-tender.   Skin: Warm and dry.   Psychiatric: Patient alert and oriented to person, place, and time. Speech and behavior appropriate. Normal mood and affect.       ECG 12 Lead    Date/Time: 2/20/2023 10:31 AM  Performed by: Rubina Guadalupe APRN  Authorized by: Rubina Guadalupe APRN   Comparison: compared with previous ECG   Similar to previous ECG  Rhythm: atrial flutter  BPM: 63                Assessment:       Diagnosis Plan   1. Ischemic cardiomyopathy        2. ICD (implantable cardioverter-defibrillator) in place        3. Essential hypertension        4. Permanent atrial fibrillation (HCC)        5. Coronary artery disease involving native coronary artery of native heart without angina pectoris               Plan:       1.-2. ICD, s/p primary prevention ICD that was PADMA 2021---shared decision made not to replace. EF normal by echo 2020. No HF.     3. HTN, controlled.     4. Perm AF---HR well controlled, apixaban for AC.     5. CAD, s/p PCI in the " past, no angina.     Follow up with Dr. Lugo in 6 months.     As always, it has been a pleasure to participate in your patient's care.      Sincerely,         JOSUE Torres

## 2023-10-27 ENCOUNTER — OFFICE VISIT (OUTPATIENT)
Age: 83
End: 2023-10-27
Payer: MEDICARE

## 2023-10-27 VITALS
BODY MASS INDEX: 27.57 KG/M2 | HEART RATE: 64 BPM | HEIGHT: 73 IN | SYSTOLIC BLOOD PRESSURE: 134 MMHG | DIASTOLIC BLOOD PRESSURE: 78 MMHG | WEIGHT: 208 LBS

## 2023-10-27 DIAGNOSIS — I25.10 CORONARY ARTERY DISEASE INVOLVING NATIVE CORONARY ARTERY OF NATIVE HEART WITHOUT ANGINA PECTORIS: ICD-10-CM

## 2023-10-27 DIAGNOSIS — I48.21 PERMANENT ATRIAL FIBRILLATION: ICD-10-CM

## 2023-10-27 DIAGNOSIS — I25.5 ISCHEMIC CARDIOMYOPATHY: Primary | ICD-10-CM

## 2023-10-27 PROCEDURE — 93000 ELECTROCARDIOGRAM COMPLETE: CPT | Performed by: INTERNAL MEDICINE

## 2023-10-27 PROCEDURE — 3075F SYST BP GE 130 - 139MM HG: CPT | Performed by: INTERNAL MEDICINE

## 2023-10-27 PROCEDURE — 99214 OFFICE O/P EST MOD 30 MIN: CPT | Performed by: INTERNAL MEDICINE

## 2023-10-27 PROCEDURE — 3078F DIAST BP <80 MM HG: CPT | Performed by: INTERNAL MEDICINE

## 2023-10-27 NOTE — PROGRESS NOTES
Date of Office Visit: 10/27/2023  Encounter Provider: Kevan Lugo MD  Place of Service: [facility] CARDIOLOGY  Patient Name: Meet Wood Jr.  : 1940    Subjective:     Encounter Date:10/27/2023      Patient ID: Meet Wood Jr. is a 83 y.o. male who has a cc of  ICM and CAD and really old MI and we had decided not to replace ICD and perm AF     He struggles with his blood sugar.     But...   No anginal chest pain,   No sig urias,   No soa,   No fainting,  No orthostasis.   No edema.   Exercise tolerance: walks but is limited by leg pain -- goes to PT     There have been no hospital admission since the last visit.     There have been no bleeding events.       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     CAD (coronary artery disease)     Chronic kidney disease     CKD 3    Diabetes     Hyperlipidemia     Hypertension     Implantable cardioverter-defibrillator (ICD) in situ     Ischemic cardiomyopathy     Myocardial infarction     PTCA only    Permanent atrial fibrillation     Sinus bradycardia        Social History     Socioeconomic History    Marital status:    Tobacco Use    Smoking status: Former    Smokeless tobacco: Never    Tobacco comments:     no caffeine use   Vaping Use    Vaping Use: Never used   Substance and Sexual Activity    Alcohol use: Yes     Comment: rarely    Drug use: Never    Sexual activity: Defer       Family History   Problem Relation Age of Onset    Heart attack Maternal Grandmother        Review of Systems   Constitutional: Negative for fever and night sweats.   HENT:  Negative for ear pain and stridor.    Eyes:  Negative for discharge and visual halos.   Cardiovascular:  Negative for cyanosis.   Respiratory:  Negative for hemoptysis and sputum production.    Hematologic/Lymphatic: Negative for adenopathy.   Skin:  Negative for nail changes and unusual hair distribution.  "  Musculoskeletal:  Positive for arthritis and joint pain. Negative for gout and joint swelling.   Gastrointestinal:  Negative for bowel incontinence and flatus.   Genitourinary:  Negative for dysuria and flank pain.   Neurological:  Negative for seizures and tremors.   Psychiatric/Behavioral:  Negative for altered mental status. The patient is not nervous/anxious.             Objective:     Vitals:    10/27/23 0940   BP: 134/78   Pulse: 64   Weight: 94.3 kg (208 lb)   Height: 185.4 cm (73\")         Eyes:      General:         Right eye: No discharge.         Left eye: No discharge.   HENT:      Head: Normocephalic and atraumatic.   Neck:      Thyroid: No thyromegaly.      Vascular: No JVD.   Pulmonary:      Effort: Pulmonary effort is normal.      Breath sounds: Normal breath sounds. No rales.   Cardiovascular:      Normal rate. Irregularly irregular rhythm.      No gallop.    Edema:     Peripheral edema absent.   Abdominal:      General: Bowel sounds are normal.      Palpations: Abdomen is soft.      Tenderness: There is no abdominal tenderness.   Musculoskeletal: Normal range of motion.         General: No deformity. Skin:     General: Skin is warm and dry.      Findings: No erythema.   Neurological:      Mental Status: Alert and oriented to person, place, and time.      Motor: Normal muscle tone.   Psychiatric:         Behavior: Behavior normal.         Thought Content: Thought content normal.           ECG 12 Lead    Date/Time: 10/27/2023 10:05 AM  Performed by: Kevan Lugo MD    Authorized by: Kevan Lugo MD  Comparison: compared with previous ECG   Similar to previous ECG  Rhythm: atrial fibrillation  Other findings: non-specific ST-T wave changes          Lab Review:       Assessment:          Diagnosis Plan   1. Ischemic cardiomyopathy        2. Coronary artery disease involving native coronary artery of native heart without angina pectoris        3. Permanent atrial fibrillation               Plan: "     He has had normal EF by echo    AF -- rate controlled and a-ban. No symptoms     HTN -- good control     CAD -- on GDMT

## 2024-05-03 ENCOUNTER — OFFICE VISIT (OUTPATIENT)
Age: 84
End: 2024-05-03
Payer: MEDICARE

## 2024-05-03 VITALS
WEIGHT: 205 LBS | BODY MASS INDEX: 27.17 KG/M2 | DIASTOLIC BLOOD PRESSURE: 68 MMHG | HEIGHT: 73 IN | HEART RATE: 62 BPM | SYSTOLIC BLOOD PRESSURE: 114 MMHG

## 2024-05-03 DIAGNOSIS — I10 ESSENTIAL HYPERTENSION: ICD-10-CM

## 2024-05-03 DIAGNOSIS — I48.21 PERMANENT ATRIAL FIBRILLATION: ICD-10-CM

## 2024-05-03 DIAGNOSIS — I25.10 CORONARY ARTERY DISEASE INVOLVING NATIVE CORONARY ARTERY OF NATIVE HEART WITHOUT ANGINA PECTORIS: ICD-10-CM

## 2024-05-03 DIAGNOSIS — I25.5 ISCHEMIC CARDIOMYOPATHY: Primary | ICD-10-CM

## 2024-05-03 PROBLEM — E10.51 TYPE 1 DIABETES MELLITUS WITH DIABETIC PERIPHERAL ANGIOPATHY WITHOUT GANGRENE: Status: ACTIVE | Noted: 2018-04-10

## 2024-05-03 RX ORDER — TAMSULOSIN HYDROCHLORIDE 0.4 MG/1
1 CAPSULE ORAL DAILY
COMMUNITY
Start: 2024-04-12

## 2024-05-03 NOTE — PROGRESS NOTES
"      ELECTROPHYSIOLOGY   Date of Office Visit: 2024  Patient Name: Meet Wood Jr.  : 1940  Encounter Provider: Riley Garcia PA-C  Electrophysiologist: Dr. Lugo  CHIEF COMPLAINT / REASON FOR OFFICE VISIT     Cardiomyopathy and permanent AFIB  6 months    HISTORY OF PRESENT ILLNESS     This is a 84 y.o. year old male who presents to [facility] CARDIOLOGY for a for a 6 month follow up.     He has a history of permanent atrial fibrillation and also history of Darius-Parkinson-White ablation in the remote past.    He has history of ischemic cardiomyopathy and CAD with a prior MI.    He has a history of an ICD placed and decision has been made to not replace the device in  as his EF had normalized to 58% on echo.    He has a history of diabetes type 1 and continues to struggle daily with erratic blood sugars.  He does follow with endocrinology.    Today the patient reports he has been doing very well over the past few months.  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    His mobility is limited by leg pain.  He is going to physical therapy 1 time a week and his mobility is slowly improving.    He denies any episodes of palpitations, dizziness or lightheadedness.    On apixaban 2.5 mg twice daily for anticoagulation.  Denies bleeding complications.    PMHx: Permanent atrial fibrillation, ischemic cardiomyopathy s/p ICD, CAD, HTN, diabetes mellitus type 1, CKD stage III    PHYSICAL EXAMINATION     Vital Signs:  /68   Pulse 62   Ht 185.4 cm (73\")   Wt 93 kg (205 lb)   BMI 27.05 kg/m²   Estimated body mass index is 27.05 kg/m² as calculated from the following:    Height as of this encounter: 185.4 cm (73\").    Weight as of this encounter: 93 kg (205 lb).             Physical Exam  Constitutional:       Appearance: Normal appearance.   HENT:      Head: Normocephalic and atraumatic.   Cardiovascular:      Rate and Rhythm: Normal rate. Tachycardia present. " "Rhythm irregular.      Heart sounds: No murmur heard.  Pulmonary:      Effort: Pulmonary effort is normal.      Breath sounds: Normal breath sounds.   Musculoskeletal:      Right lower leg: No edema.      Left lower leg: No edema.   Skin:     General: Skin is warm and dry.   Neurological:      General: No focal deficit present.      Mental Status: He is alert and oriented to person, place, and time.          Cardiac Testing/Results     Cardiac Testing:   - Echo 1/15/2020: EF 58% with mild TR. RVSP 27.3 mmHg    Result Review :  The following data was reviewed by: Riley Garcia PA-C on 05/03/2024:       Lab Results   Component Value Date     09/29/2022     06/10/2021    K 3.6 09/29/2022    K 4.6 06/10/2021     (H) 09/29/2022     (H) 06/10/2021    CO2 22 09/29/2022    CO2 24 06/10/2021    BUN 32 (H) 09/29/2022    BUN 28 (H) 06/10/2021    CREATININE 1.71 (H) 09/29/2022    CREATININE 1.5 06/10/2021    EGFRIFAFRI 47 (L) 09/29/2022    CALCIUM 9.0 09/29/2022    CALCIUM 9.2 06/10/2021    ALBUMIN 3.9 09/29/2022    ALBUMIN 4.0 06/10/2021    AST 29 09/29/2022    AST 23 06/10/2021    ALT 23 09/29/2022    ALT 16 06/10/2021     Lab Results   Component Value Date    WBC 6.13 02/07/2024    WBC 6.43 10/06/2023    HGB 14.8 02/07/2024    HGB 13.5 10/06/2023    HCT 44.5 02/07/2024    HCT 41.4 10/06/2023    MCV 90.4 02/07/2024    MCV 91.6 10/06/2023     02/07/2024     10/06/2023     No results found for: \"PROBNP\", \"BNP\"  No results found for: \"CKTOTAL\", \"CKMB\", \"CKMBINDEX\", \"TROPONINI\", \"TROPONINT\"  Lab Results   Component Value Date    TSH 3.060 06/10/2021    TSH 2.380 12/12/2019                 ECG 12 Lead    Date/Time: 5/3/2024 9:11 AM  Performed by: Riley Garcia PA-C    Authorized by: Riley Garcia PA-C  Comparison: compared with previous ECG from 10/27/2023  Rhythm: atrial flutter  Rate: normal  BPM: 62  QRS axis: left  Comments: Atrial flutter, rate controlled with " heart rate in the 62.  Left axis deviation.  No acute T wave changes.  QTc 434                ASSESSMENT & PLAN       Diagnoses and all orders for this visit:    1. Ischemic cardiomyopathy (Primary)  -     ECG 12 Lead  He has history of a prior MI back in 1995 and at that time he had a decline in his ejection fraction  He had had an ICD implanted for primary prevention  On echo in 2021 he had improvement of his ejection fraction to 50% and did not receive any therapies from his defibrillator.  Decision was made to opt to forego defibrillator reimplantation.  Pocket with well-seated device today  Continue Coreg/ACE  2. Permanent atrial fibrillation  -     ECG 12 Lead  Currently in rate controlled atrial fibrillation/flutter.  Denies any episodes of palpitations  Continue carvedilol 25 mg twice daily  Currently takes apixaban 2.5 mg twice daily, denies bleeding complications  3. Coronary artery disease involving native coronary artery of native heart without angina pectoris  No new anginal symptoms.  T waves stable on EKG today  History of prior MI.  Continue beta-blocker and Lipitor  4. Essential hypertension  Blood pressures been well-controlled, he does not regularly monitor at home but at other office has been well-controlled.  He does have the ability to do it in the future if needed  5.  Type 1 diabetes melitis with use of insulin and circulatory complication  Follows closely with endocrinology.  He struggles daily with high and low blood sugars.  On insulin    Follow Up:  Return in about 6 months (around 11/3/2024) for Dr. Lugo- Routine.  Patient was given instructions and counseling regarding his condition or for health maintenance advice. Please contact office if worsening symptoms or proceed to ER when appropriate.      Riley Garcia PA-C  05/03/24  09:35 EDT    MEDICATIONS         Discharge Medications            Accurate as of May 3, 2024  9:35 AM. If you have any questions, ask your nurse or  doctor.                Continue These Medications        Instructions Start Date   apixaban 2.5 MG tablet tablet  Commonly known as: ELIQUIS   2.5 mg, Oral, 2 Times Daily, New dose as of 3/30/22.      atorvastatin 40 MG tablet  Commonly known as: LIPITOR   40 mg, Oral, Daily      carvedilol 25 MG tablet  Commonly known as: COREG   25 mg, Oral, 2 Times Daily      Dexcom G6 Sensor   CHANGE EVERY 10 DAYS      Diclofenac Sodium 1 % gel gel  Commonly known as: VOLTAREN   2 g, Topical, 3 Times Daily      Gvoke HypoPen 2-Pack 1 MG/0.2ML solution auto-injector  Generic drug: Glucagon   0.2 mg, Subcutaneous, 2 Times Daily      insulin aspart prot-insulin aspart (70-30) 100 UNIT/ML injection  Commonly known as: novoLOG 70/30   1 Units, Subcutaneous, 2 Times Daily With Meals      insulin glargine 100 UNIT/ML injection  Commonly known as: LANTUS, SEMGLEE   11 Units, Subcutaneous      lisinopril 10 MG tablet  Commonly known as: PRINIVIL,ZESTRIL   10 mg, Oral      tamsulosin 0.4 MG capsule 24 hr capsule  Commonly known as: FLOMAX   1 capsule, Oral, Daily                   **Dragon Disclaimer: This note was dictated using an electronic transcription. The electronic translation of spoken language may permit erroneous, or at times, nonsensical words or phrases to be inadvertently transcribed. Although I have reviewed the note for such errors, some may still exist.

## 2024-09-06 ENCOUNTER — OFFICE (OUTPATIENT)
Dept: URBAN - METROPOLITAN AREA CLINIC 76 | Facility: CLINIC | Age: 84
End: 2024-09-06

## 2024-09-06 ENCOUNTER — OFFICE (OUTPATIENT)
Age: 84
End: 2024-09-06

## 2024-09-06 VITALS
WEIGHT: 209 LBS | DIASTOLIC BLOOD PRESSURE: 81 MMHG | DIASTOLIC BLOOD PRESSURE: 81 MMHG | DIASTOLIC BLOOD PRESSURE: 81 MMHG | HEART RATE: 62 BPM | WEIGHT: 209 LBS | HEIGHT: 73 IN | WEIGHT: 209 LBS | HEART RATE: 62 BPM | HEART RATE: 62 BPM | DIASTOLIC BLOOD PRESSURE: 81 MMHG | HEART RATE: 62 BPM | SYSTOLIC BLOOD PRESSURE: 129 MMHG | HEART RATE: 62 BPM | DIASTOLIC BLOOD PRESSURE: 81 MMHG | SYSTOLIC BLOOD PRESSURE: 129 MMHG | HEART RATE: 62 BPM | DIASTOLIC BLOOD PRESSURE: 81 MMHG | WEIGHT: 209 LBS | SYSTOLIC BLOOD PRESSURE: 129 MMHG | SYSTOLIC BLOOD PRESSURE: 129 MMHG | HEIGHT: 73 IN | HEIGHT: 73 IN | HEIGHT: 73 IN | WEIGHT: 209 LBS | HEART RATE: 62 BPM | SYSTOLIC BLOOD PRESSURE: 129 MMHG | SYSTOLIC BLOOD PRESSURE: 129 MMHG | WEIGHT: 209 LBS | DIASTOLIC BLOOD PRESSURE: 81 MMHG | HEIGHT: 73 IN | HEIGHT: 73 IN | WEIGHT: 209 LBS | HEIGHT: 73 IN | SYSTOLIC BLOOD PRESSURE: 129 MMHG

## 2024-09-06 DIAGNOSIS — Z86.010 PERSONAL HISTORY OF COLON POLYPS: ICD-10-CM

## 2024-09-06 DIAGNOSIS — R19.7 DIARRHEA, UNSPECIFIED: ICD-10-CM

## 2024-09-06 DIAGNOSIS — Z80.9 FAMILY HISTORY OF MALIGNANT NEOPLASM, UNSPECIFIED: ICD-10-CM

## 2024-09-06 PROCEDURE — 99204 OFFICE O/P NEW MOD 45 MIN: CPT | Performed by: INTERNAL MEDICINE

## 2024-10-08 ENCOUNTER — TELEPHONE (OUTPATIENT)
Age: 84
End: 2024-10-08
Payer: MEDICARE

## 2024-10-08 NOTE — TELEPHONE ENCOUNTER
Received a fax from Gastroenterology stating pt is scheduling a colonoscopy with Dr. Ny. They are asking if pt can move forward and if he can hold his apixaban 1-2 days prior to. Pt has NO history of stroke or valve replacement...Ama

## 2024-11-13 ENCOUNTER — OFFICE VISIT (OUTPATIENT)
Age: 84
End: 2024-11-13
Payer: MEDICARE

## 2024-11-13 VITALS
WEIGHT: 204 LBS | BODY MASS INDEX: 27.04 KG/M2 | HEIGHT: 73 IN | DIASTOLIC BLOOD PRESSURE: 80 MMHG | SYSTOLIC BLOOD PRESSURE: 126 MMHG | HEART RATE: 62 BPM

## 2024-11-13 DIAGNOSIS — I25.10 CORONARY ARTERY DISEASE INVOLVING NATIVE CORONARY ARTERY OF NATIVE HEART WITHOUT ANGINA PECTORIS: ICD-10-CM

## 2024-11-13 DIAGNOSIS — I48.21 PERMANENT ATRIAL FIBRILLATION: ICD-10-CM

## 2024-11-13 DIAGNOSIS — I25.5 ISCHEMIC CARDIOMYOPATHY: Primary | ICD-10-CM

## 2024-11-13 PROCEDURE — 3079F DIAST BP 80-89 MM HG: CPT | Performed by: INTERNAL MEDICINE

## 2024-11-13 PROCEDURE — 93000 ELECTROCARDIOGRAM COMPLETE: CPT | Performed by: INTERNAL MEDICINE

## 2024-11-13 PROCEDURE — 3074F SYST BP LT 130 MM HG: CPT | Performed by: INTERNAL MEDICINE

## 2024-11-13 PROCEDURE — 99214 OFFICE O/P EST MOD 30 MIN: CPT | Performed by: INTERNAL MEDICINE

## 2024-11-13 NOTE — PROGRESS NOTES
Date of Office Visit: 2024  Encounter Provider: Kevan Lugo MD  Place of Service: [facility] CARDIOLOGY  Patient Name: Meet Wood Jr.  : 1940    Subjective:     Encounter Date:2024      Patient ID: Meet Wood Jr. is a 84 y.o. male who has a cc of  resolved ICM and perm aF and an ICD that is deactivated and not replaced.     The patient had a good year-- except sciatica.   No anginal chest pain,   No sig urias,   No soa,   No fainting,  No orthostasis.   No edema.   Exercise tolerance: limited by back pain     There have been no hospital admission since the last visit.     There have been no bleeding events.       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     CAD (coronary artery disease)     Chronic kidney disease     CKD 3    Diabetes     Hyperlipidemia     Hypertension     Implantable cardioverter-defibrillator (ICD) in situ     Ischemic cardiomyopathy     Myocardial infarction     PTCA only    Permanent atrial fibrillation     Sinus bradycardia        Social History     Socioeconomic History    Marital status:    Tobacco Use    Smoking status: Former    Smokeless tobacco: Never    Tobacco comments:     no caffeine use   Vaping Use    Vaping status: Never Used   Substance and Sexual Activity    Alcohol use: Yes     Comment: rarely    Drug use: Never    Sexual activity: Defer       Family History   Problem Relation Age of Onset    Heart attack Maternal Grandmother        Review of Systems   Constitutional: Negative for fever and night sweats.   HENT:  Negative for ear pain and stridor.    Eyes:  Negative for discharge and visual halos.   Cardiovascular:  Negative for cyanosis.   Respiratory:  Negative for hemoptysis and sputum production.    Hematologic/Lymphatic: Negative for adenopathy.   Skin:  Negative for nail changes and unusual hair distribution.   Musculoskeletal:  Positive for  "arthritis and back pain. Negative for gout and joint swelling.   Gastrointestinal:  Negative for bowel incontinence and flatus.   Genitourinary:  Negative for dysuria and flank pain.   Neurological:  Negative for seizures and tremors.   Psychiatric/Behavioral:  Negative for altered mental status. The patient is not nervous/anxious.             Objective:     Vitals:    11/13/24 0945   BP: 126/80   BP Location: Right arm   Patient Position: Sitting   Pulse: 62   Weight: 92.5 kg (204 lb)   Height: 185.4 cm (73\")         Eyes:      General:         Right eye: No discharge.         Left eye: No discharge.   HENT:      Head: Normocephalic and atraumatic.   Neck:      Thyroid: No thyromegaly.      Vascular: No JVD.   Pulmonary:      Effort: Pulmonary effort is normal.      Breath sounds: Normal breath sounds. No rales.   Cardiovascular:      Normal rate. Irregularly irregular rhythm.      No gallop.    Edema:     Peripheral edema absent.   Abdominal:      General: Bowel sounds are normal.      Palpations: Abdomen is soft.      Tenderness: There is no abdominal tenderness.   Musculoskeletal: Normal range of motion.         General: No deformity. Skin:     General: Skin is warm and dry.      Findings: No erythema.   Neurological:      Mental Status: Alert and oriented to person, place, and time.      Motor: Normal muscle tone.   Psychiatric:         Behavior: Behavior normal.         Thought Content: Thought content normal.           ECG 12 Lead    Date/Time: 11/13/2024 9:49 AM  Performed by: Kevan Lugo MD    Authorized by: Kevan Lugo MD  Comparison: compared with previous ECG   Similar to previous ECG  Rhythm: atrial flutter          Lab Review:       Assessment:          Diagnosis Plan   1. Ischemic cardiomyopathy        2. Permanent atrial fibrillation        3. Coronary artery disease involving native coronary artery of native heart without angina pectoris               Plan:     He is fine.     AFllutter -- " rate controlled. On CKD-adjusted a-ban.     No cardiac symptoms. Normalized EF.     CAD -- no symptoms. And on statins.

## 2024-12-17 ENCOUNTER — ON CAMPUS - OUTPATIENT (OUTPATIENT)
Dept: URBAN - METROPOLITAN AREA HOSPITAL 108 | Facility: HOSPITAL | Age: 84
End: 2024-12-17
Payer: COMMERCIAL

## 2024-12-17 DIAGNOSIS — Z09 ENCOUNTER FOR FOLLOW-UP EXAMINATION AFTER COMPLETED TREATMEN: ICD-10-CM

## 2024-12-17 DIAGNOSIS — D12.0 BENIGN NEOPLASM OF CECUM: ICD-10-CM

## 2024-12-17 DIAGNOSIS — Z86.0101 PERSONAL HISTORY OF ADENOMATOUS AND SERRATED COLON POLYPS: ICD-10-CM

## 2024-12-17 PROCEDURE — 45385 COLONOSCOPY W/LESION REMOVAL: CPT | Mod: PT | Performed by: INTERNAL MEDICINE

## 2025-03-06 ENCOUNTER — OFFICE (OUTPATIENT)
Dept: URBAN - METROPOLITAN AREA CLINIC 76 | Facility: CLINIC | Age: 85
End: 2025-03-06
Payer: MEDICARE

## 2025-03-06 VITALS — WEIGHT: 205 LBS | DIASTOLIC BLOOD PRESSURE: 70 MMHG | HEIGHT: 73 IN | SYSTOLIC BLOOD PRESSURE: 128 MMHG

## 2025-03-06 DIAGNOSIS — Z86.0101 PERSONAL HISTORY OF ADENOMATOUS AND SERRATED COLON POLYPS: ICD-10-CM

## 2025-03-06 DIAGNOSIS — Z80.9 FAMILY HISTORY OF MALIGNANT NEOPLASM, UNSPECIFIED: ICD-10-CM

## 2025-03-06 PROCEDURE — 99213 OFFICE O/P EST LOW 20 MIN: CPT

## 2025-08-15 ENCOUNTER — OFFICE VISIT (OUTPATIENT)
Age: 85
End: 2025-08-15
Payer: MEDICARE

## 2025-08-15 VITALS
WEIGHT: 204 LBS | DIASTOLIC BLOOD PRESSURE: 82 MMHG | HEART RATE: 62 BPM | SYSTOLIC BLOOD PRESSURE: 140 MMHG | BODY MASS INDEX: 27.04 KG/M2 | HEIGHT: 73 IN

## 2025-08-15 DIAGNOSIS — I10 ESSENTIAL HYPERTENSION: ICD-10-CM

## 2025-08-15 DIAGNOSIS — I25.10 CORONARY ARTERY DISEASE INVOLVING NATIVE CORONARY ARTERY OF NATIVE HEART WITHOUT ANGINA PECTORIS: ICD-10-CM

## 2025-08-15 DIAGNOSIS — Z95.810 ICD (IMPLANTABLE CARDIOVERTER-DEFIBRILLATOR) IN PLACE: ICD-10-CM

## 2025-08-15 DIAGNOSIS — I48.21 PERMANENT ATRIAL FIBRILLATION: Primary | ICD-10-CM

## 2025-08-15 DIAGNOSIS — E10.51 TYPE 1 DIABETES MELLITUS WITH DIABETIC PERIPHERAL ANGIOPATHY WITHOUT GANGRENE: ICD-10-CM

## 2025-08-15 PROCEDURE — 3077F SYST BP >= 140 MM HG: CPT | Performed by: PHYSICIAN ASSISTANT

## 2025-08-15 PROCEDURE — 3079F DIAST BP 80-89 MM HG: CPT | Performed by: PHYSICIAN ASSISTANT

## 2025-08-15 PROCEDURE — 1159F MED LIST DOCD IN RCRD: CPT | Performed by: PHYSICIAN ASSISTANT

## 2025-08-15 PROCEDURE — 1160F RVW MEDS BY RX/DR IN RCRD: CPT | Performed by: PHYSICIAN ASSISTANT

## 2025-08-15 PROCEDURE — 99214 OFFICE O/P EST MOD 30 MIN: CPT | Performed by: PHYSICIAN ASSISTANT
